# Patient Record
Sex: MALE | NOT HISPANIC OR LATINO | Employment: FULL TIME | URBAN - NONMETROPOLITAN AREA
[De-identification: names, ages, dates, MRNs, and addresses within clinical notes are randomized per-mention and may not be internally consistent; named-entity substitution may affect disease eponyms.]

---

## 2017-01-19 ENCOUNTER — HOSPITAL ENCOUNTER (OUTPATIENT)
Dept: LAB | Facility: MEDICAL CENTER | Age: 32
End: 2017-01-19
Attending: INTERNAL MEDICINE
Payer: COMMERCIAL

## 2017-01-19 LAB
ALBUMIN SERPL BCP-MCNC: 4.4 G/DL (ref 3.2–4.9)
ALBUMIN/GLOB SERPL: 1.3 G/DL
ALP SERPL-CCNC: 86 U/L (ref 30–99)
ALT SERPL-CCNC: 121 U/L (ref 2–50)
ANION GAP SERPL CALC-SCNC: 7 MMOL/L (ref 0–11.9)
AST SERPL-CCNC: 52 U/L (ref 12–45)
BILIRUB SERPL-MCNC: 0.5 MG/DL (ref 0.1–1.5)
BUN SERPL-MCNC: 13 MG/DL (ref 8–22)
CALCIUM SERPL-MCNC: 9.5 MG/DL (ref 8.5–10.5)
CHLORIDE SERPL-SCNC: 103 MMOL/L (ref 96–112)
CO2 SERPL-SCNC: 28 MMOL/L (ref 20–33)
CORTIS SERPL-MCNC: 16.6 UG/DL (ref 0–23)
CREAT SERPL-MCNC: 0.86 MG/DL (ref 0.5–1.4)
CREAT UR-MCNC: 157.2 MG/DL
EST. AVERAGE GLUCOSE BLD GHB EST-MCNC: 255 MG/DL
GLOBULIN SER CALC-MCNC: 3.4 G/DL (ref 1.9–3.5)
GLUCOSE SERPL-MCNC: 134 MG/DL (ref 65–99)
HBA1C MFR BLD: 10.5 % (ref 0–5.6)
POTASSIUM SERPL-SCNC: 4.1 MMOL/L (ref 3.6–5.5)
PROT SERPL-MCNC: 7.8 G/DL (ref 6–8.2)
SODIUM SERPL-SCNC: 138 MMOL/L (ref 135–145)
T3FREE SERPL-MCNC: 3.93 PG/ML (ref 2.4–4.2)
T4 FREE SERPL-MCNC: 1.12 NG/DL (ref 0.53–1.43)
TSH SERPL DL<=0.005 MIU/L-ACNC: 2.77 UIU/ML (ref 0.3–3.7)

## 2017-01-19 PROCEDURE — 84105 ASSAY OF URINE PHOSPHORUS: CPT

## 2017-01-19 PROCEDURE — 82340 ASSAY OF CALCIUM IN URINE: CPT

## 2017-01-19 PROCEDURE — 82088 ASSAY OF ALDOSTERONE: CPT

## 2017-01-19 PROCEDURE — 80053 COMPREHEN METABOLIC PANEL: CPT

## 2017-01-19 PROCEDURE — 36415 COLL VENOUS BLD VENIPUNCTURE: CPT

## 2017-01-19 PROCEDURE — 83036 HEMOGLOBIN GLYCOSYLATED A1C: CPT

## 2017-01-19 PROCEDURE — 84439 ASSAY OF FREE THYROXINE: CPT

## 2017-01-19 PROCEDURE — 84443 ASSAY THYROID STIM HORMONE: CPT

## 2017-01-19 PROCEDURE — 82570 ASSAY OF URINE CREATININE: CPT

## 2017-01-19 PROCEDURE — 82533 TOTAL CORTISOL: CPT

## 2017-01-19 PROCEDURE — 84481 FREE ASSAY (FT-3): CPT

## 2017-01-21 LAB
ALDOST SERPL-MCNC: 5.6 NG/DL
PHOSPHATE 24H UR-MCNC: 75 MG/DL
PHOSPHATE 24H UR-MRATE: NORMAL MG/D (ref 400–1300)
PHOSPHATE/CREAT 24H UR: 547 MG/G
TOTAL VOLUME 1105: NORMAL ML

## 2017-01-23 LAB
CALCIUM 24H UR-MCNC: 4.2 MG/DL
CALCIUM 24H UR-MRATE: NORMAL MG/D
CALCIUM/CREAT 24H UR: 31 MG/G (ref 20–240)
CREAT 24H UR-MCNC: 137 MG/DL
CREAT 24H UR-MRATE: NORMAL MG/D (ref 1000–2500)
TOTAL VOLUME 1105: NORMAL ML

## 2017-12-20 ENCOUNTER — OFFICE VISIT (OUTPATIENT)
Dept: URGENT CARE | Facility: PHYSICIAN GROUP | Age: 32
End: 2017-12-20
Payer: COMMERCIAL

## 2017-12-20 VITALS
TEMPERATURE: 97.6 F | WEIGHT: 300 LBS | HEART RATE: 96 BPM | RESPIRATION RATE: 14 BRPM | BODY MASS INDEX: 42 KG/M2 | OXYGEN SATURATION: 93 % | DIASTOLIC BLOOD PRESSURE: 96 MMHG | HEIGHT: 71 IN | SYSTOLIC BLOOD PRESSURE: 124 MMHG

## 2017-12-20 DIAGNOSIS — R42 DIZZINESS: ICD-10-CM

## 2017-12-20 LAB
GLUCOSE BLD-MCNC: 113 MG/DL (ref 70–100)
HBA1C MFR BLD: 8.1 % (ref ?–5.8)
INT CON NEG: NEGATIVE
INT CON POS: POSITIVE

## 2017-12-20 PROCEDURE — 99214 OFFICE O/P EST MOD 30 MIN: CPT | Performed by: PHYSICIAN ASSISTANT

## 2017-12-20 PROCEDURE — 82962 GLUCOSE BLOOD TEST: CPT | Performed by: PHYSICIAN ASSISTANT

## 2017-12-20 PROCEDURE — 83036 HEMOGLOBIN GLYCOSYLATED A1C: CPT | Performed by: PHYSICIAN ASSISTANT

## 2017-12-20 RX ORDER — MECLIZINE HYDROCHLORIDE 25 MG/1
25 TABLET ORAL 3 TIMES DAILY PRN
Qty: 30 TAB | Refills: 0 | Status: SHIPPED
Start: 2017-12-20 | End: 2020-01-23

## 2017-12-20 NOTE — LETTER
December 20, 2017         Patient: Trev Castillo   YOB: 1985   Date of Visit: 12/20/2017           To Whom it May Concern:    Trev Castillo was seen in my clinic on 12/20/2017. He may return to school on 12/22/17.    If you have any questions or concerns, please don't hesitate to call.        Sincerely,           Leanne Mckeon P.A.-C.  Electronically Signed

## 2017-12-20 NOTE — PROGRESS NOTES
"Chief Complaint   Patient presents with   • Nasal Congestion       HISTORY OF PRESENT ILLNESS: Patient is a 32 y.o. male who presents today for the following:    Dizzy and light headed x last night  Denies: Chest pain, diaphoresis, abdominal pain fever, nasal congestion, cough, ST, ear pain, SOB, urinary symptoms, changes in bowels, unexplained weight loss, nausea, vomiting, shortness of breath  + HA  Symptoms are worse with head movement and change in position  Reports recent sore throat and nasal congestion but it did not last very long  OTC meds tried: none  Currently feels off balance  H/o light headedness due to high blood sugar  H/o Lantus, metformin; around 10-11 months  Taking \"berberine\", OTC, to help maintain BG  Last checked blood glucose last night: 92     Patient Active Problem List    Diagnosis Date Noted   • Low back pain 08/29/2014   • Migraine headache without aura 08/06/2014       Allergies:Pcn [penicillins]    Current Outpatient Prescriptions Ordered in [facility]   Medication Sig Dispense Refill   • meclizine (ANTIVERT) 25 MG Tab Take 1 Tab by mouth 3 times a day as needed for Dizziness. 30 Tab 0     No current Epic-ordered facility-administered medications on file.        Past Medical History:   Diagnosis Date   • Migraine headache        Social History   Substance Use Topics   • Smoking status: Never Smoker   • Smokeless tobacco: Never Used   • Alcohol use No      Comment: 4-5 beers twice per week       No family status information on file.     Family History   Problem Relation Age of Onset   • Other Brother      migraine       ROS:    Review of Systems   Constitutional: Negative for fever, chills, weight loss and malaise/fatigue.   HENT: Negative for ear pain, nosebleeds, congestion, sore throat and neck pain.    Eyes: Negative for blurred vision.   Respiratory: Negative for cough, sputum production, shortness of breath and wheezing.    Cardiovascular: Negative for chest pain, palpitations, " "orthopnea and leg swelling.   Gastrointestinal: Negative for heartburn, nausea, vomiting and abdominal pain.   Genitourinary: Negative for dysuria, urgency and frequency.       Exam:  Blood pressure 124/96, pulse 96, temperature 36.4 °C (97.6 °F), resp. rate 14, height 1.803 m (5' 11\"), weight (!) 136.1 kg (300 lb), SpO2 93 %.  General: Well developed, well nourished. No distress.  HEENT: Conjunctiva clear, lids without ptosis, PERRL/EOMI. Ears normal shape and contour, canals are clear bilaterally, tympanic membranes are benign. Nasal mucosa benign. Oropharynx is without erythema, edema or exudates. Reasonable dentition.  Neck: Trachea midline, no masses. No thyromegaly. No carotid bruits noted.  Pulmonary: Clear to ausculation and percussion.  Normal effort. No rales, ronchi, or wheezing.   Cardiovascular: Regular rate and rhythm without murmur. No edema.   Abdomen: Soft, non-tender, nondistended. No hepatosplenomegaly.  Bowel sounds within normal limits.  Neurologic: Grossly nonfocal.  Lymph: No cervical lymphadenopathy noted.  Skin: Warm, dry, good turgor. No rashes in visible areas.   Psych: Normal mood. Alert and oriented x3. Judgment and insight is normal.     Glucose: 113  POCT A1c: 8.1    Assessment/Plan:  Discussed likely viral etiology. Discussed appropriate over-the-counter symptomatic medication, and when to return to clinic.  1. Uncontrolled type 2 diabetes mellitus without complication, without long-term current use of insulin (CMS-[facility])     Recommend following up with primary care provider for further evaluation and management. Discussed importance of lifestyle changes to promote weight loss but may need to go back on prescription medication for diabetes while he is making these changes. Discussed potential complications from uncontrolled diabetes.  POCT  A1C    POCT glucose   2. Dizziness   Discussed unknown etiology. No apparent signs of stroke. Use all medication as directed. Follow up for " worsening or persistent symptoms.  meclizine (ANTIVERT) 25 MG Tab

## 2018-01-15 ENCOUNTER — OFFICE VISIT (OUTPATIENT)
Dept: URGENT CARE | Facility: PHYSICIAN GROUP | Age: 33
End: 2018-01-15
Payer: COMMERCIAL

## 2018-01-15 VITALS
BODY MASS INDEX: 41.86 KG/M2 | TEMPERATURE: 97.2 F | HEART RATE: 80 BPM | RESPIRATION RATE: 16 BRPM | SYSTOLIC BLOOD PRESSURE: 130 MMHG | OXYGEN SATURATION: 96 % | DIASTOLIC BLOOD PRESSURE: 92 MMHG | HEIGHT: 71 IN | WEIGHT: 299 LBS

## 2018-01-15 DIAGNOSIS — E66.01 MORBID OBESITY WITH BMI OF 40.0-44.9, ADULT (HCC): ICD-10-CM

## 2018-01-15 DIAGNOSIS — J00 OTHER ACUTE RHINITIS: ICD-10-CM

## 2018-01-15 DIAGNOSIS — H92.01 OTALGIA OF RIGHT EAR: ICD-10-CM

## 2018-01-15 PROCEDURE — 99214 OFFICE O/P EST MOD 30 MIN: CPT | Performed by: PHYSICIAN ASSISTANT

## 2018-01-15 RX ORDER — FLUTICASONE PROPIONATE 50 MCG
1 SPRAY, SUSPENSION (ML) NASAL 2 TIMES DAILY
Qty: 1 BOTTLE | Refills: 0 | Status: SHIPPED
Start: 2018-01-15 | End: 2020-01-23

## 2018-01-15 NOTE — PROGRESS NOTES
"Chief Complaint   Patient presents with   • Otalgia     difficulty hearing, ringing, All Sx x3d       HISTORY OF PRESENT ILLNESS: Patient is a 32 y.o. male who presents today for the following:    Right ear pain x 3 days  Denies drainage, ST  + ringing, difficulty hearing, nasal, cough  OTC ear drops without relief    Patient Active Problem List    Diagnosis Date Noted   • Morbid obesity with BMI of 40.0-44.9, adult ([facility]) 01/15/2018   • Low back pain 08/29/2014   • Migraine headache without aura 08/06/2014       Allergies:Pcn [penicillins]    Current Outpatient Prescriptions Ordered in [facility]   Medication Sig Dispense Refill   • fluticasone (FLONASE) 50 MCG/ACT nasal spray Spray 1 Spray in nose 2 times a day. 1 Bottle 0   • meclizine (ANTIVERT) 25 MG Tab Take 1 Tab by mouth 3 times a day as needed for Dizziness. 30 Tab 0     No current Epic-ordered facility-administered medications on file.        Past Medical History:   Diagnosis Date   • Migraine headache        Social History   Substance Use Topics   • Smoking status: Never Smoker   • Smokeless tobacco: Never Used   • Alcohol use No      Comment: 4-5 beers twice per week       Family Status   Relation Status   • Brother      Family History   Problem Relation Age of Onset   • Other Brother      migraine       ROS:    Review of Systems   Constitutional: Negative for fever, chills, weight loss and malaise/fatigue.   HENT: Negative for nosebleeds, sore throat and neck pain.    Eyes: Negative for blurred vision.   Respiratory: Negative for sputum production, shortness of breath and wheezing.    Cardiovascular: Negative for chest pain, palpitations, orthopnea and leg swelling.   Gastrointestinal: Negative for heartburn, nausea, vomiting and abdominal pain.   Genitourinary: Negative for dysuria, urgency and frequency.       Exam:  Blood pressure 130/92, pulse 80, temperature 36.2 °C (97.2 °F), resp. rate 16, height 1.803 m (5' 11\"), weight (!) 135.6 kg (299 lb), SpO2 96 " %.  General: Well developed, well nourished. No distress.  HEENT: Conjunctiva clear, lids without ptosis, PERRL/EOMI. Ears normal shape and contour, canals are clear bilaterally, tympanic membranes are benign but bulging on the right side. Nasal mucosa benign. Oropharynx is without erythema, edema or exudates. Moist mucous membranes.  Pulmonary: Clear to ausculation and percussion.  Normal effort. No rales, ronchi, or wheezing.   Cardiovascular: Regular rate and rhythm without murmur. No edema.   Neurologic: Grossly nonfocal.  Lymph: No cervical lymphadenopathy noted.  Skin: Warm, dry, good turgor. No rashes in visible areas.   Psych: Normal mood. Alert and oriented x3. Judgment and insight is normal.    Assessment/Plan:  Discussed likely viral etiology. Discussed appropriate over-the-counter symptomatic medication, and when to return to clinic. Use medication as instructed. Follow-up for worsening or persistent symptoms.  1. Other acute rhinitis  fluticasone (FLONASE) 50 MCG/ACT nasal spray   2. Otalgia of right ear     3. Morbid obesity with BMI of 40.0-44.9, adult (CMS-[facility])  Patient identified as having weight management issue.  Appropriate orders and counseling given.

## 2018-02-08 ENCOUNTER — OFFICE VISIT (OUTPATIENT)
Dept: URGENT CARE | Facility: CLINIC | Age: 33
End: 2018-02-08
Payer: COMMERCIAL

## 2018-02-08 VITALS
OXYGEN SATURATION: 95 % | TEMPERATURE: 97.5 F | BODY MASS INDEX: 41.44 KG/M2 | WEIGHT: 296 LBS | DIASTOLIC BLOOD PRESSURE: 90 MMHG | HEART RATE: 104 BPM | RESPIRATION RATE: 16 BRPM | SYSTOLIC BLOOD PRESSURE: 128 MMHG | HEIGHT: 71 IN

## 2018-02-08 DIAGNOSIS — E11.00 UNCONTROLLED TYPE 2 DIABETES MELLITUS WITH HYPEROSMOLARITY WITHOUT COMA, UNSPECIFIED LONG TERM INSULIN USE STATUS: ICD-10-CM

## 2018-02-08 DIAGNOSIS — R00.0 TACHYCARDIA: ICD-10-CM

## 2018-02-08 DIAGNOSIS — R42 LIGHTHEADEDNESS: ICD-10-CM

## 2018-02-08 PROCEDURE — 99214 OFFICE O/P EST MOD 30 MIN: CPT | Performed by: NURSE PRACTITIONER

## 2018-02-08 ASSESSMENT — ENCOUNTER SYMPTOMS
MUSCULOSKELETAL NEGATIVE: 1
EYES NEGATIVE: 1
DIZZINESS: 1
CARDIOVASCULAR NEGATIVE: 1
NAUSEA: 1
RESPIRATORY NEGATIVE: 1
CHILLS: 1

## 2018-02-08 NOTE — PROGRESS NOTES
Subjective:      Trev Castillo is a 32 y.o. male who presents with Nausea    Past Medical History:   Diagnosis Date   • Migraine headache      Social History     Social History   • Marital status: Unknown     Spouse name: N/A   • Number of children: N/A   • Years of education: N/A     Occupational History   • Not on file.     Social History Main Topics   • Smoking status: Never Smoker   • Smokeless tobacco: Never Used   • Alcohol use No      Comment: 4-5 beers twice per week   • Drug use: No   • Sexual activity: Yes     Other Topics Concern   • Not on file     Social History Narrative   • No narrative on file     Family History   Problem Relation Age of Onset   • Other Brother      migraine       Allergies: Pcn [penicillins]    Patient is a 32-year-old male who presents today with complaint of feeling lightheaded and fatigued. Symptoms started over the last 7-10 days. States last night at home he became very lightheaded and almost went to the emergency room. Patient denies any chest pain or shortness of breath. Denies syncope. States he's had nausea but no vomiting. Patient does have a history of type 2 diabetes. He states he has not seen his physician in over a year. He occasionally takes metformin and insulin when he believes his blood sugar is elevated. He is not certain what his most recent A1c was. In reviewing the patient's chart, his last A1c was done on 12/20/17 and was 8.1%. Positive family history of diabetes in patient's mother and cardiovascular disease.            Nausea   This is a new problem. The current episode started in the past 7 days. The problem occurs intermittently. The problem has been waxing and waning. Associated symptoms include chills and nausea. Nothing aggravates the symptoms. He has tried nothing for the symptoms. The treatment provided no relief.       Review of Systems   Constitutional: Positive for chills and malaise/fatigue.   HENT: Negative.    Eyes: Negative.    Respiratory:  "Negative.    Cardiovascular: Negative.    Gastrointestinal: Positive for nausea.   Genitourinary: Negative.    Musculoskeletal: Negative.    Skin: Negative.    Neurological: Positive for dizziness.   All other systems reviewed and are negative.         Objective:     /90   Pulse (!) 104   Temp 36.4 °C (97.5 °F)   Resp 16   Ht 1.803 m (5' 11\")   Wt (!) 134.3 kg (296 lb)   SpO2 95%   BMI 41.28 kg/m²      Physical Exam   Constitutional: He is oriented to person, place, and time. He appears well-developed and well-nourished.   HENT:   Head: Normocephalic.   Right Ear: External ear normal.   Left Ear: External ear normal.   Nose: Nose normal.   Eyes: Conjunctivae and EOM are normal. Pupils are equal, round, and reactive to light.   Neck: Normal range of motion. Neck supple.   Cardiovascular: Regular rhythm and normal heart sounds.    HR elevated at 104   Pulmonary/Chest: Effort normal and breath sounds normal.   Abdominal: Soft. Bowel sounds are normal. He exhibits no distension and no mass. There is no tenderness. There is no guarding.   Musculoskeletal: Normal range of motion.   Neurological: He is alert and oriented to person, place, and time.   Skin: Skin is warm and dry. Capillary refill takes less than 2 seconds.   Psychiatric: He has a normal mood and affect. His behavior is normal. Judgment and thought content normal.   Vitals reviewed.    EKG: Sinus tachycardia, rate 105; no ST elevation or depression noted, no ischemic changes.        Discussed with patient that I am concerned for his ongoing symptoms; at this location and time of day I am limited on diagnostics.  Discussed going to ER with patient; he states he had considered this last night due to the severity of his symptoms.  States he is willing to go to ER for further evaluation. Patient declined EMS; states he will go via POV driven by a friend.     Assessment/Plan:   Light headedness  Tachycardia   -patient referred to ER for higher level " of care at this time   -patient discharged from UC in stable condition to be driven to ER via POV    There are no diagnoses linked to this encounter.

## 2018-06-08 ENCOUNTER — APPOINTMENT (OUTPATIENT)
Dept: SLEEP MEDICINE | Facility: MEDICAL CENTER | Age: 33
End: 2018-06-08
Payer: COMMERCIAL

## 2018-10-11 ENCOUNTER — OFFICE VISIT (OUTPATIENT)
Dept: INTERNAL MEDICINE | Facility: MEDICAL CENTER | Age: 33
End: 2018-10-11
Payer: COMMERCIAL

## 2018-10-11 VITALS
WEIGHT: 275.38 LBS | TEMPERATURE: 98.2 F | DIASTOLIC BLOOD PRESSURE: 98 MMHG | SYSTOLIC BLOOD PRESSURE: 128 MMHG | HEIGHT: 69 IN | HEART RATE: 94 BPM | BODY MASS INDEX: 40.79 KG/M2 | OXYGEN SATURATION: 98 %

## 2018-10-11 DIAGNOSIS — E66.01 CLASS 3 SEVERE OBESITY DUE TO EXCESS CALORIES WITH BODY MASS INDEX (BMI) OF 40.0 TO 44.9 IN ADULT, UNSPECIFIED WHETHER SERIOUS COMORBIDITY PRESENT (HCC): ICD-10-CM

## 2018-10-11 DIAGNOSIS — F12.90 MARIJUANA USE: ICD-10-CM

## 2018-10-11 DIAGNOSIS — R20.2 TINGLING IN EXTREMITIES: ICD-10-CM

## 2018-10-11 DIAGNOSIS — E11.65 UNCONTROLLED TYPE 2 DIABETES MELLITUS WITH HYPERGLYCEMIA (HCC): ICD-10-CM

## 2018-10-11 DIAGNOSIS — R79.89 LOW VITAMIN D LEVEL: ICD-10-CM

## 2018-10-11 DIAGNOSIS — Z00.00 HEALTHCARE MAINTENANCE: ICD-10-CM

## 2018-10-11 LAB
HBA1C MFR BLD: 6.2 % (ref ?–5.8)
INT CON NEG: NORMAL
INT CON POS: NORMAL

## 2018-10-11 PROCEDURE — 83036 HEMOGLOBIN GLYCOSYLATED A1C: CPT | Mod: GC | Performed by: INTERNAL MEDICINE

## 2018-10-11 PROCEDURE — 99204 OFFICE O/P NEW MOD 45 MIN: CPT | Mod: GC | Performed by: INTERNAL MEDICINE

## 2018-10-11 ASSESSMENT — ENCOUNTER SYMPTOMS
COUGH: 0
CONSTIPATION: 0
VOMITING: 0
DIZZINESS: 0
BLURRED VISION: 0
SORE THROAT: 0
HEARTBURN: 0
POLYDIPSIA: 0
DIARRHEA: 0
FEVER: 0
LOSS OF CONSCIOUSNESS: 0
CHILLS: 0
WEIGHT LOSS: 0
PALPITATIONS: 0
SHORTNESS OF BREATH: 0
HEADACHES: 1
MYALGIAS: 0
NAUSEA: 0
DOUBLE VISION: 0
SEIZURES: 0
SINUS PAIN: 0

## 2018-10-11 ASSESSMENT — LIFESTYLE VARIABLES: SUBSTANCE_ABUSE: 1

## 2018-10-11 ASSESSMENT — PATIENT HEALTH QUESTIONNAIRE - PHQ9: CLINICAL INTERPRETATION OF PHQ2 SCORE: 0

## 2018-10-11 NOTE — PROGRESS NOTES
New Patient to Establish    Reason to establish: Acute Illness    CC: Numbness and tingling    HPI:   Numbness and tingling in hands and feet:  For the past couple months the patient has had numbness/tingling in his hands and feet. He states that it comes and goes. It is not associated with weakness. He has had no incontinence bladder/bowel.  No changes in gait. He denies any pain. He did not notice any sores on his feet.     History of diabetes:  Not currently on medication. Diagnosed December of last year. He was given insulin and metformin ran out of prescription and never refilled. States he checks his blood sugar at home with a monitor and it has run between . His A1c in clinic today was 6.2.     Headaches: Patient has a history of migraines. Not currently on medication. He has taken Imitrex in the past, but it did not help. He is taking marijuana for the headaches and states that it helps. He has the headache once every two weeks. He states that it is located in the front of his head on both sides. He has sensitivity to light, and it feels like a pressure behind his eyes. He has associated nausea, but no vomiting. The sometimes last all day/ day and a half. He states that he sleeps the headache off and uses marijuana to help with it. He denies any aura with the headache. Last headache was 2 days ago. Denies headache when he wakes up in the morning. He states that he was worked up for these headaches and given scans of his brain. He states that the workup never revealed any abnormalities.     The patient declined flu shot today.     Patient Active Problem List    Diagnosis Date Noted   • Morbid obesity with BMI of 40.0-44.9, adult ([facility]) 01/15/2018   • Low back pain 08/29/2014   • Migraine headache without aura 08/06/2014       Past Medical History:   Diagnosis Date   • Migraine headache        Current Outpatient Prescriptions   Medication Sig Dispense Refill   • fluticasone (FLONASE) 50 MCG/ACT nasal  spray Spray 1 Spray in nose 2 times a day. 1 Bottle 0   • meclizine (ANTIVERT) 25 MG Tab Take 1 Tab by mouth 3 times a day as needed for Dizziness. 30 Tab 0     No current facility-administered medications for this visit.        Allergies as of 10/11/2018 - Reviewed 10/11/2018   Allergen Reaction Noted   • Pcn [penicillins] Itching 09/03/2013       Social History     Social History   • Marital status: Unknown     Spouse name: N/A   • Number of children: N/A   • Years of education: N/A     Occupational History   • Not on file.     Social History Main Topics   • Smoking status: Never Smoker   • Smokeless tobacco: Never Used   • Alcohol use No      Comment: 4-5 beers twice per week   • Drug use: No   • Sexual activity: Yes     Other Topics Concern   • Not on file     Social History Narrative   • No narrative on file       Family History   Problem Relation Age of Onset   • Other Brother         migraine       Past Surgical History:   Procedure Laterality Date   • APPENDECTOMY         ROS:  Review of Systems   Constitutional: Negative for chills, fever and weight loss.   HENT: Negative for congestion, sinus pain and sore throat.    Eyes: Negative for blurred vision and double vision.   Respiratory: Negative for cough and shortness of breath.    Cardiovascular: Negative for chest pain, palpitations and leg swelling.   Gastrointestinal: Negative for constipation, diarrhea, heartburn, nausea and vomiting.   Genitourinary: Negative for dysuria, frequency and urgency.   Musculoskeletal: Negative for joint pain and myalgias.   Skin: Negative for itching and rash.   Neurological: Positive for headaches. Negative for dizziness, seizures and loss of consciousness.   Endo/Heme/Allergies: Negative for environmental allergies and polydipsia.   Psychiatric/Behavioral: Positive for substance abuse. Negative for suicidal ideas.       /98 (BP Location: Left arm, Patient Position: Sitting, BP Cuff Size: Large adult)   Pulse 94    "Temp 36.8 °C (98.2 °F) (Temporal)   Ht 1.759 m (5' 9.25\")   Wt 124.9 kg (275 lb 6 oz)   SpO2 98%   BMI 40.37 kg/m²     Physical Exam  General:  Alert and oriented, No apparent distress.    Eyes: Pupils equal and reactive. No scleral icterus.    Throat: Clear no erythema or exudates noted.    Neck: Supple. No lymphadenopathy noted. Thyroid not enlarged.    Lungs: Clear to auscultation and percussion bilaterally.    Cardiovascular: Regular rate and rhythm. No murmurs, rubs or gallops.    Abdomen:  Benign. No rebound or guarding noted.    Extremities: No clubbing, cyanosis, edema.    Skin: Clear. No rash or suspicious skin lesions noted.    Monofilament testing with a 10 gram force: sensation: intact bilaterally  Visual Inspection: Feet without maceration, ulcers, or fissures.  Pedal pulses: intact bilaterally      Assessment and Plan    1. Numbness tingling hands and feet  2. History of Diabetes  - Follow up A1c, lipids, CBC, CMP, B12, TSH, and microalbumin  - POCT A1c is 6.2.   - Patient has already taken Diabetes education class  - Last eye exam was earlier this year  - Last foot exam was today in clinic  - Patient understands the importance of checking feet for ulcers  - Patient's diabetes is well-controlled with diet and exercise.  - Will continue to monitor    3. History of migraine headaches without Aura  - Patient has been using Marijuana to control symptoms of headache.   - Will continue to monitor    4. Morbid Obesity  - Patient is agreeable to exercise and weight loss. Is not currently exercising.    5. History of Vitamin D deficiency  - Patient has a history of low Vitamin D  - Follow up Vitamin D level.    Followup: Return in about 5 weeks (around 11/15/2018).    Risk Assessment (discuss potential complications a function of chronic problems): Encouraged healthy diet, exercise, and continuing to monitor blood sugar. Encouraged weight loss to help control.     Complexity (discuss number of " co-morbidities): 2    Signed by: Tong Jones M.D.

## 2018-10-22 ENCOUNTER — HOSPITAL ENCOUNTER (OUTPATIENT)
Dept: LAB | Facility: MEDICAL CENTER | Age: 33
End: 2018-10-22
Attending: PHYSICIAN ASSISTANT
Payer: COMMERCIAL

## 2018-10-22 ENCOUNTER — OFFICE VISIT (OUTPATIENT)
Dept: URGENT CARE | Facility: PHYSICIAN GROUP | Age: 33
End: 2018-10-22
Payer: COMMERCIAL

## 2018-10-22 ENCOUNTER — APPOINTMENT (OUTPATIENT)
Dept: RADIOLOGY | Facility: IMAGING CENTER | Age: 33
End: 2018-10-22
Attending: PHYSICIAN ASSISTANT
Payer: COMMERCIAL

## 2018-10-22 VITALS
OXYGEN SATURATION: 98 % | BODY MASS INDEX: 40.52 KG/M2 | RESPIRATION RATE: 18 BRPM | SYSTOLIC BLOOD PRESSURE: 130 MMHG | TEMPERATURE: 98.3 F | HEART RATE: 97 BPM | DIASTOLIC BLOOD PRESSURE: 82 MMHG | HEIGHT: 70 IN | WEIGHT: 283 LBS

## 2018-10-22 DIAGNOSIS — R06.02 SHORTNESS OF BREATH: Primary | ICD-10-CM

## 2018-10-22 DIAGNOSIS — R06.02 SHORTNESS OF BREATH: ICD-10-CM

## 2018-10-22 LAB
BNP SERPL-MCNC: 5 PG/ML (ref 0–100)
D DIMER PPP IA.FEU-MCNC: <0.4 UG/ML (FEU) (ref 0–0.5)

## 2018-10-22 PROCEDURE — 99214 OFFICE O/P EST MOD 30 MIN: CPT | Performed by: PHYSICIAN ASSISTANT

## 2018-10-22 PROCEDURE — 93000 ELECTROCARDIOGRAM COMPLETE: CPT | Performed by: PHYSICIAN ASSISTANT

## 2018-10-22 PROCEDURE — 83880 ASSAY OF NATRIURETIC PEPTIDE: CPT

## 2018-10-22 PROCEDURE — 85379 FIBRIN DEGRADATION QUANT: CPT

## 2018-10-22 PROCEDURE — 71046 X-RAY EXAM CHEST 2 VIEWS: CPT | Mod: 26 | Performed by: PHYSICIAN ASSISTANT

## 2018-10-22 PROCEDURE — 36415 COLL VENOUS BLD VENIPUNCTURE: CPT

## 2018-10-22 NOTE — PROGRESS NOTES
Subjective:      Trev Castillo is a 33 y.o. male who presents with Shortness of Breath (feels like he can't breathe when he has to walk)    PMH:  has a past medical history of Migraine headache.  MEDS:   Current Outpatient Prescriptions:   •  fluticasone (FLONASE) 50 MCG/ACT nasal spray, Spray 1 Spray in nose 2 times a day., Disp: 1 Bottle, Rfl: 0  •  meclizine (ANTIVERT) 25 MG Tab, Take 1 Tab by mouth 3 times a day as needed for Dizziness., Disp: 30 Tab, Rfl: 0  ALLERGIES:   Allergies   Allergen Reactions   • Pcn [Penicillins] Itching     SURGHX:   Past Surgical History:   Procedure Laterality Date   • APPENDECTOMY       SOCHX:  reports that he has never smoked. He has never used smokeless tobacco. He reports that he drinks alcohol. He reports that he uses drugs, including Marijuana.  FH: Reviewed with patient/family. Not pertinent to this complaint.            Patient presents with:  Shortness of Breath: feels like he can't breathe when he has to walk extended times and when going up stairs.  PT states this began this morning.  PT denies chest pain, chest pressure, neck pain, back pain, upper extremity pain, lower extremity swelling or recent travel.      PT states he went out for his birthday a two nights ago and states he was very very hung over, with episodes of vomiting and not eating/drinking yesterday , states he thinks this may be part of his symptoms.  Pt states he attempted to work today, but had to leave and needs a note.             Shortness of Breath   This is a new problem. The current episode started yesterday. The problem occurs intermittently. The problem has been waxing and waning. Associated symptoms include headaches and vomiting (yesterday). Pertinent negatives include no abdominal pain, chest pain, fever, hemoptysis, leg pain, leg swelling, orthopnea, PND, sputum production or wheezing. The symptoms are aggravated by exercise. The patient has no known risk factors for DVT/PE. He has tried nothing  "for the symptoms. The treatment provided no relief. There is no history of asthma, bronchiolitis, CAD, COPD, DVT, a heart failure, PE or a recent surgery.       Review of Systems   Constitutional: Positive for malaise/fatigue. Negative for fever.   Respiratory: Positive for shortness of breath. Negative for cough, hemoptysis, sputum production and wheezing.    Cardiovascular: Negative for chest pain, orthopnea, leg swelling and PND.   Gastrointestinal: Positive for vomiting (yesterday). Negative for abdominal pain.   Neurological: Positive for headaches.   All other systems reviewed and are negative.         Objective:     /82   Pulse 97   Temp 36.8 °C (98.3 °F) (Temporal)   Resp 18   Ht 1.778 m (5' 10\")   Wt (!) 128.4 kg (283 lb)   SpO2 98%   BMI 40.61 kg/m²      Physical Exam   Constitutional: He is oriented to person, place, and time. Vital signs are normal. He appears well-developed and well-nourished. He does not appear ill. No distress.   HENT:   Head: Normocephalic and atraumatic.   Nose: Nose normal.   Eyes: Pupils are equal, round, and reactive to light. Conjunctivae and EOM are normal.   Neck: Normal range of motion. Neck supple.   Cardiovascular: Normal rate, regular rhythm and normal heart sounds.    Pulmonary/Chest: Effort normal and breath sounds normal. No respiratory distress. He has no wheezes. He has no rales. He exhibits no tenderness.   Abdominal: Soft.   Musculoskeletal: Normal range of motion. He exhibits no edema.   Neurological: He is alert and oriented to person, place, and time. Gait normal.   Skin: Skin is warm and dry. Capillary refill takes less than 2 seconds.   Psychiatric: He has a normal mood and affect.   Nursing note and vitals reviewed.         EKG Interpretation   Interpreted by me   Rhythm: normal sinus   Rate: normal   Axis: normal   Ectopy: none   Conduction: normal   ST Segments: no acute change   T Waves: no acute change   Q Waves: none   Clinical Impression: no " acute changes and normal EKG    Xray images viewed and interpreted by me, confirmed by radiology:    No acute infiltrate, no PTX or free air. No acute findings.       Assessment/Plan:     1. Shortness of breath  DX-CHEST-2 VIEWS    BTYPE NATRIURETIC PEPTIDE    D-DIMER     Discussed DDx of PE, CHF, PNA, flu.  PT agreed to EKG, CXR now.  Both were normal.  PT will have blood drawn (D-Dimer and BNP) in the lab. Declined flu test.     Pt states he does not feel that it is necessary to be seen in ED at this time.     PT should follow up with PCP in 1-2 days for re-evaluation if symptoms have not improved.  Discussed red flags and reasons to return to UC or ED.  Pt and/or family verbalized understanding of diagnosis and follow up instructions and was offered informational handout on diagnosis.  PT discharged.

## 2018-10-22 NOTE — LETTER
October 22, 2018         Patient: Trev Castillo   YOB: 1985   Date of Visit: 10/22/2018           To Whom it May Concern:    Trev Castillo was seen in my clinic on 10/22/2018. He may return to work on 10/23/2018.    If you have any questions or concerns, please don't hesitate to call.        Sincerely,           Poppy Meehan P.A.-C.  Electronically Signed

## 2018-10-23 ASSESSMENT — ENCOUNTER SYMPTOMS: SHORTNESS OF BREATH: 1

## 2018-10-25 ASSESSMENT — ENCOUNTER SYMPTOMS
ABDOMINAL PAIN: 0
VOMITING: 1
WHEEZING: 0
ORTHOPNEA: 0
HEMOPTYSIS: 0
PND: 0
FEVER: 0
SPUTUM PRODUCTION: 0
COUGH: 0
HEADACHES: 1
LEG PAIN: 0

## 2018-10-25 ASSESSMENT — COPD QUESTIONNAIRES: COPD: 0

## 2019-04-02 ENCOUNTER — PATIENT MESSAGE (OUTPATIENT)
Dept: HEALTH INFORMATION MANAGEMENT | Facility: OTHER | Age: 34
End: 2019-04-02

## 2020-01-14 ENCOUNTER — OFFICE VISIT (OUTPATIENT)
Dept: URGENT CARE | Facility: PHYSICIAN GROUP | Age: 35
End: 2020-01-14
Payer: COMMERCIAL

## 2020-01-14 VITALS
TEMPERATURE: 99.2 F | RESPIRATION RATE: 18 BRPM | DIASTOLIC BLOOD PRESSURE: 80 MMHG | HEART RATE: 103 BPM | SYSTOLIC BLOOD PRESSURE: 130 MMHG | BODY MASS INDEX: 37.02 KG/M2 | OXYGEN SATURATION: 96 % | WEIGHT: 258 LBS

## 2020-01-14 DIAGNOSIS — Z86.39 HISTORY OF DIABETES MELLITUS, TYPE II: ICD-10-CM

## 2020-01-14 DIAGNOSIS — J22 LRTI (LOWER RESPIRATORY TRACT INFECTION): ICD-10-CM

## 2020-01-14 PROCEDURE — 99214 OFFICE O/P EST MOD 30 MIN: CPT | Performed by: PHYSICIAN ASSISTANT

## 2020-01-14 RX ORDER — AZITHROMYCIN 250 MG/1
TABLET, FILM COATED ORAL
Qty: 6 TAB | Refills: 0 | Status: SHIPPED
Start: 2020-01-14 | End: 2020-01-23

## 2020-01-14 RX ORDER — ALBUTEROL SULFATE 90 UG/1
1-2 AEROSOL, METERED RESPIRATORY (INHALATION) EVERY 6 HOURS PRN
Qty: 1 INHALER | Refills: 0 | Status: SHIPPED
Start: 2020-01-14 | End: 2020-02-28

## 2020-01-14 RX ORDER — BENZONATATE 100 MG/1
100 CAPSULE ORAL 3 TIMES DAILY PRN
Qty: 30 CAP | Refills: 0 | Status: SHIPPED
Start: 2020-01-14 | End: 2020-01-23

## 2020-01-14 ASSESSMENT — ENCOUNTER SYMPTOMS
MYALGIAS: 1
CHILLS: 1
SHORTNESS OF BREATH: 1
DIARRHEA: 0
NECK PAIN: 0
PALPITATIONS: 0
DIZZINESS: 0
HEADACHES: 0
ABDOMINAL PAIN: 0
NAUSEA: 0
VOMITING: 0
EYE REDNESS: 0
SPUTUM PRODUCTION: 1
WHEEZING: 0
CONSTIPATION: 0
POLYDIPSIA: 0
EYE PAIN: 0
SORE THROAT: 1
COUGH: 1
FEVER: 1

## 2020-01-15 NOTE — PROGRESS NOTES
Subjective:      Trev Castillo is a 34 y.o. male who presents with Headache (cough, sore throat, congestion, tired x4 days )            HPI   34-year-old male presents urgent care with new problem of headache, productive cough, sore throat and nasal congestion onset 4 days ago.  Patient reports mild associated wheezing, he denies shortness of breath.  Reports subjective fevers, chills, and generalized fatigue. +body aches.   Patient reports his wife has similar symptoms.  Patient did not get influenza vaccination this season.   Denies history or asthma or tobacco use.   He has been taking Nyquil and teraflu with some relief.   Denies other associated aggravating or alleviating factors.     2. Medication refill   Patient requesting medication refill for metformin.  Patient states he lost his primary care physician.  He states he has been out of his medication for several months.  He denies abdominal pain, nausea, vomiting, or fevers.    Review of Systems   Constitutional: Positive for chills, fever and malaise/fatigue.   HENT: Positive for congestion, ear pain and sore throat.    Eyes: Negative for pain and redness.   Respiratory: Positive for cough, sputum production and shortness of breath. Negative for wheezing.    Cardiovascular: Negative for chest pain and palpitations.   Gastrointestinal: Negative for abdominal pain, constipation, diarrhea, nausea and vomiting.   Genitourinary: Negative for dysuria, frequency and urgency.   Musculoskeletal: Positive for myalgias. Negative for neck pain.   Skin: Negative for rash.   Neurological: Negative for dizziness and headaches.   Endo/Heme/Allergies: Negative for environmental allergies and polydipsia.       Past Medical History:   Diagnosis Date   • Migraine headache      Current Outpatient Medications on File Prior to Visit   Medication Sig Dispense Refill   • fluticasone (FLONASE) 50 MCG/ACT nasal spray Spray 1 Spray in nose 2 times a day. (Patient not taking: Reported on  1/14/2020) 1 Bottle 0   • meclizine (ANTIVERT) 25 MG Tab Take 1 Tab by mouth 3 times a day as needed for Dizziness. (Patient not taking: Reported on 1/14/2020) 30 Tab 0     No current facility-administered medications on file prior to visit.      Allergies   Allergen Reactions   • Pcn [Penicillins] Itching     Social History     Tobacco Use   • Smoking status: Never Smoker   • Smokeless tobacco: Never Used   Substance Use Topics   • Alcohol use: Yes     Comment: 4-5 beers twice per week      Objective:     /80   Pulse (!) 103   Temp 37.3 °C (99.2 °F)   Resp 18   Wt 117 kg (258 lb)   SpO2 96%   BMI 37.02 kg/m²      Physical Exam  Vitals signs reviewed.   Constitutional:       General: He is not in acute distress.     Appearance: Normal appearance. He is well-developed. He is not ill-appearing.   HENT:      Head: Normocephalic and atraumatic.      Right Ear: Tympanic membrane normal.      Left Ear: Tympanic membrane normal.      Nose: Mucosal edema, congestion and rhinorrhea present.      Mouth/Throat:      Mouth: Mucous membranes are moist.      Pharynx: Posterior oropharyngeal erythema present. No oropharyngeal exudate.   Eyes:      Extraocular Movements: Extraocular movements intact.      Conjunctiva/sclera: Conjunctivae normal.   Neck:      Musculoskeletal: Normal range of motion and neck supple.   Cardiovascular:      Rate and Rhythm: Normal rate and regular rhythm.      Pulses: Normal pulses.      Heart sounds: Normal heart sounds.   Pulmonary:      Effort: Pulmonary effort is normal. No respiratory distress.      Breath sounds: Wheezing present. No rhonchi or rales.   Abdominal:      General: Bowel sounds are normal. There is no distension.      Palpations: Abdomen is soft.   Musculoskeletal: Normal range of motion.   Skin:     General: Skin is warm and dry.      Findings: No rash.   Neurological:      General: No focal deficit present.      Mental Status: He is alert and oriented to person, place,  and time.   Psychiatric:         Mood and Affect: Mood normal.         Behavior: Behavior normal.         Thought Content: Thought content normal.         Judgment: Judgment normal.                 Assessment/Plan:     1. History of diabetes mellitus, type II  HEMOGLOBIN A1C    Comp Metabolic Panel    ESTIMATED GFR   2. LRTI (lower respiratory tract infection)  azithromycin (ZITHROMAX) 250 MG Tab    albuterol 108 (90 Base) MCG/ACT Aero Soln inhalation aerosol    benzonatate (TESSALON) 100 MG Cap     1.  We will follow-up pending lab results.  I will consider medication refill for metformin after patient has lab work done.  Patient informed he must find primary care provider and cannot return to urgent care for refill of this medication in the future.    2.  Continue with over-the-counter cold and cough medications and Tylenol/Motrin for symptomatic relief.  PT should follow up with PCP in 1-2 days for re-evaluation if symptoms have not improved.  Discussed red flags and reasons to return to UC or ED.  Pt and/or family verbalized understanding of diagnosis and follow up instructions and was offered informational handout on diagnosis.  PT discharged.

## 2020-01-22 ENCOUNTER — TELEPHONE (OUTPATIENT)
Dept: SCHEDULING | Facility: IMAGING CENTER | Age: 35
End: 2020-01-22

## 2020-01-23 ENCOUNTER — OFFICE VISIT (OUTPATIENT)
Dept: MEDICAL GROUP | Facility: LAB | Age: 35
End: 2020-01-23
Payer: COMMERCIAL

## 2020-01-23 VITALS
BODY MASS INDEX: 36.51 KG/M2 | HEIGHT: 70 IN | TEMPERATURE: 97.1 F | RESPIRATION RATE: 18 BRPM | DIASTOLIC BLOOD PRESSURE: 92 MMHG | SYSTOLIC BLOOD PRESSURE: 148 MMHG | WEIGHT: 255 LBS | OXYGEN SATURATION: 100 % | HEART RATE: 92 BPM

## 2020-01-23 DIAGNOSIS — E11.65 UNCONTROLLED TYPE 2 DIABETES MELLITUS WITH HYPERGLYCEMIA (HCC): ICD-10-CM

## 2020-01-23 DIAGNOSIS — E78.2 MIXED HYPERLIPIDEMIA: ICD-10-CM

## 2020-01-23 DIAGNOSIS — R03.0 ELEVATED BLOOD PRESSURE READING: ICD-10-CM

## 2020-01-23 PROBLEM — Z00.00 HEALTHCARE MAINTENANCE: Status: RESOLVED | Noted: 2018-10-11 | Resolved: 2020-01-23

## 2020-01-23 LAB
HBA1C MFR BLD: 12.9 % (ref 0–5.6)
INT CON NEG: NEGATIVE
INT CON POS: POSITIVE

## 2020-01-23 PROCEDURE — 83036 HEMOGLOBIN GLYCOSYLATED A1C: CPT | Performed by: FAMILY MEDICINE

## 2020-01-23 PROCEDURE — 99204 OFFICE O/P NEW MOD 45 MIN: CPT | Performed by: FAMILY MEDICINE

## 2020-01-23 RX ORDER — GLUCOSAMINE HCL/CHONDROITIN SU 500-400 MG
CAPSULE ORAL
Qty: 100 EACH | Refills: 0 | Status: SHIPPED | OUTPATIENT
Start: 2020-01-23 | End: 2021-03-02

## 2020-01-23 RX ORDER — LANCETS 30 GAUGE
EACH MISCELLANEOUS
Qty: 100 EACH | Refills: 0 | Status: SHIPPED | OUTPATIENT
Start: 2020-01-23 | End: 2021-03-02

## 2020-01-23 ASSESSMENT — ENCOUNTER SYMPTOMS
SHORTNESS OF BREATH: 0
ABDOMINAL PAIN: 0
FEVER: 0
WEIGHT LOSS: 0
NAUSEA: 0
DIARRHEA: 0
PALPITATIONS: 0
COUGH: 0
VOMITING: 0
TINGLING: 0
BLURRED VISION: 0
CHILLS: 0

## 2020-01-23 ASSESSMENT — PATIENT HEALTH QUESTIONNAIRE - PHQ9: CLINICAL INTERPRETATION OF PHQ2 SCORE: 0

## 2020-01-23 NOTE — PROGRESS NOTES
Trev Castillo is a 34 y.o. male here for   Chief Complaint   Patient presents with   • Establish Care   • Diabetes Mellitus     2016-17 type 2 diabetes,        HPI:  Trev is a very pleasant 34 y.o. male.     #DM2   -Diagnosed approx. 2 years ago  -Previous medication: Metformin, Insulin (Lantus?)   -Pt states that for a time he had gotten his weight, A1c down to the point where no medication was needed; however, over the last year he states that he has put weight back on and has noted his blood sugar rising.   -Increased activity 2 month ago, running approx 1 mile a day, change in diet, reducing sugar intake.   -Here to reestablish for care of diabetes   -Endorses polydypsia, polyuria.  -Denies increased fatigue, neuopathy     #Hyperlipidemia  -Previous history of DLD according to labs; however, patient is unaware of previous diagnosis.   -Denies every taking medication for tx.   Results for TREV CASTILLO (MRN [id number]) as of 1/23/2020 16:16   Ref. Range 12/27/2016 06:27   Cholesterol,Tot Latest Ref Range: 100 - 199 mg/dL 271 (H)   Triglycerides Latest Ref Range: 0 - 149 mg/dL 128   HDL Latest Ref Range: >=40 mg/dL 37 (A)   LDL Latest Ref Range: <100 mg/dL 208 (H)     #Elevated BP reading today.   -no previous diagnosis of HTN.   -Denies headache, lightheadeness, dizziness, chest pain, SOB.     Current medicines (including changes today)  Current Outpatient Medications   Medication Sig Dispense Refill   • Alcohol Swabs Wipe site with prep pad prior to injection. 100 Each 0   • Lancets Use one One Touch Verio lancet to test blood sugar four times daily. 100 Each 0   • insulin glargine (INSULIN GLARGINE) 100 UNIT/ML Solution Pen-injector injection Inject 10 Units as instructed every evening for 360 days. 3 mL 11   • metformin (GLUCOPHAGE) 1000 MG tablet Take 1 Tab by mouth 2 times a day, with meals for 360 days. 180 Tab 3   • Insulin Pen Needle 32 G x 4 mm Use one pen needle in pen device to inject insulin four times daily.  100 Each 0   • Blood Glucose Test Strips Use one One Touch Verio strip to test blood sugar four times daily. 100 Strip 11   • albuterol 108 (90 Base) MCG/ACT Aero Soln inhalation aerosol Inhale 1-2 Puffs by mouth every 6 hours as needed for Shortness of Breath. 1 Inhaler 0     No current facility-administered medications for this visit.      He  has a past medical history of Diabetes (HCC), Hypertension, and Migraine headache.  He  has a past surgical history that includes appendectomy.  Social History     Tobacco Use   • Smoking status: Never Smoker   • Smokeless tobacco: Never Used   Substance Use Topics   • Alcohol use: Yes     Comment: Every now and then   • Drug use: Not Currently     Types: Marijuana     Comment: 1 x every 3 weeks     Social History     Patient does not qualify to have social determinant information on file (likely too young).   Social History Narrative   • Not on file     Family History   Problem Relation Age of Onset   • Other Brother         migraine   • Diabetes Mother    • Diabetes Brother      Family Status   Relation Name Status   • Bro  (Not Specified)   • Mo Eudelia Anna (Not Specified)   • Bro Jim Anna (Not Specified)         ROS  Review of Systems   Constitutional: Negative for chills, fever and weight loss.   HENT: Negative for hearing loss.    Eyes: Negative for blurred vision.   Respiratory: Negative for cough and shortness of breath.    Cardiovascular: Negative for chest pain and palpitations.   Gastrointestinal: Negative for abdominal pain, diarrhea, nausea and vomiting.   Genitourinary: Negative for dysuria.   Skin: Negative for rash.   Neurological: Negative for tingling.   All other systems reviewed and are negative.       Objective:     /92 (BP Location: Right arm, Patient Position: Sitting, BP Cuff Size: Adult)   Pulse 92   Temp 36.2 °C (97.1 °F) (Temporal)   Resp 18   Wt 115.7 kg (255 lb)   SpO2 100%  Body mass index is 36.59 kg/m².  Physical  Exam:    Constitutional: Alert, no distress.  Skin: Warm, dry, good turgor, no rashes in visible areas.  Eye: Equal, round and reactive, conjunctiva clear, lids normal.  ENMT: Lips without lesions, good dentition, oropharynx clear. TM's pearly gray with normal light reflexes bilaterally  Neck: Trachea midline, no masses, no thyromegaly. No cervical or supraclavicular lymphadenopathy.  Respiratory: Unlabored respiratory effort, lungs clear to auscultation bilaterally, no wheezes, rales, or ronchi.  Cardiovascular: Normal S1, S2, RRR, no murmur, no edema.  Abdomen: Soft, non-tender, no masses, no hepatosplenomegaly.  Psych: Alert and oriented x3, normal affect and mood.        Assessment and Plan:   The following treatment plan was discussed    1. Uncontrolled type 2 diabetes mellitus with hyperglycemia (HCC)  -chronic condition, new to me.   -status: unstable  -A1c completed in office, elevated to >12%. At this time with such elevated a1c and symptomatic, we will need to begin treatment with metformin as well as insulin.   -discussed the need to checking blood glucose QID.   -target fasting blood glucose 140. Will begin with 10 units lantus. Will increase 2 units every 4 days until target fasting blood glucose is met.   -will also meet with diabetes  as well as dietitian.   -F/u 1 month.   - POCT Hemoglobin A1C  - Lipid Profile; Future  - Basic Metabolic Panel; Future  - MICROALBUMIN CREAT RATIO URINE; Future  - REFERRAL TO NUTRITION SERVICES  - Alcohol Swabs; Wipe site with prep pad prior to injection.  Dispense: 100 Each; Refill: 0  - Lancets; Use one One Touch Verio lancet to test blood sugar four times daily.  Dispense: 100 Each; Refill: 0  - insulin glargine (INSULIN GLARGINE) 100 UNIT/ML Solution Pen-injector injection; Inject 10 Units as instructed every evening for 360 days.  Dispense: 3 mL; Refill: 11  - metformin (GLUCOPHAGE) 1000 MG tablet; Take 1 Tab by mouth 2 times a day, with meals for 360 days.   Dispense: 180 Tab; Refill: 3  - Insulin Pen Needle 32 G x 4 mm; Use one pen needle in pen device to inject insulin four times daily.  Dispense: 100 Each; Refill: 0  - Blood Glucose Test Strips; Use one One Touch Verio strip to test blood sugar four times daily.  Dispense: 100 Strip; Refill: 11    2. Elevated blood pressure reading  -No previous hx, asymptomatic at this point   -Patient told to check blood pressure occasionally over the next month. Report if higher than 140/80.   -If needed, will start on lisinopril for bp control given hx of DM2    3. Mixed hyperlipidemia  -Chronic condition, new to me.   -Status: unstable.   -Given hx of elevated cholesterol, LDL patient will need to be on statin.   -Will discuss at next visit.     Records requested.  Followup: Return in about 4 weeks (around 2/20/2020).         This note was created using voice recognition software. I have made every reasonable attempt to correct errors, however, I do anticipate some grammatical errors.

## 2020-02-20 ENCOUNTER — HOSPITAL ENCOUNTER (OUTPATIENT)
Dept: LAB | Facility: MEDICAL CENTER | Age: 35
End: 2020-02-20
Attending: FAMILY MEDICINE
Payer: COMMERCIAL

## 2020-02-20 DIAGNOSIS — E11.65 UNCONTROLLED TYPE 2 DIABETES MELLITUS WITH HYPERGLYCEMIA (HCC): ICD-10-CM

## 2020-02-20 LAB
ANION GAP SERPL CALC-SCNC: 8 MMOL/L (ref 0–11.9)
BUN SERPL-MCNC: 19 MG/DL (ref 8–22)
CALCIUM SERPL-MCNC: 9.3 MG/DL (ref 8.5–10.5)
CHLORIDE SERPL-SCNC: 105 MMOL/L (ref 96–112)
CHOLEST SERPL-MCNC: 180 MG/DL (ref 100–199)
CO2 SERPL-SCNC: 25 MMOL/L (ref 20–33)
CREAT SERPL-MCNC: 0.8 MG/DL (ref 0.5–1.4)
CREAT UR-MCNC: 129.8 MG/DL
FASTING STATUS PATIENT QL REPORTED: NORMAL
GLUCOSE SERPL-MCNC: 149 MG/DL (ref 65–99)
HDLC SERPL-MCNC: 41 MG/DL
LDLC SERPL CALC-MCNC: 124 MG/DL
MICROALBUMIN UR-MCNC: 1.4 MG/DL
MICROALBUMIN/CREAT UR: 11 MG/G (ref 0–30)
POTASSIUM SERPL-SCNC: 4.6 MMOL/L (ref 3.6–5.5)
SODIUM SERPL-SCNC: 138 MMOL/L (ref 135–145)
TRIGL SERPL-MCNC: 75 MG/DL (ref 0–149)

## 2020-02-20 PROCEDURE — 82570 ASSAY OF URINE CREATININE: CPT

## 2020-02-20 PROCEDURE — 36415 COLL VENOUS BLD VENIPUNCTURE: CPT

## 2020-02-20 PROCEDURE — 82043 UR ALBUMIN QUANTITATIVE: CPT

## 2020-02-20 PROCEDURE — 80061 LIPID PANEL: CPT

## 2020-02-20 PROCEDURE — 80048 BASIC METABOLIC PNL TOTAL CA: CPT

## 2020-02-28 ENCOUNTER — OFFICE VISIT (OUTPATIENT)
Dept: MEDICAL GROUP | Facility: LAB | Age: 35
End: 2020-02-28
Payer: COMMERCIAL

## 2020-02-28 VITALS
DIASTOLIC BLOOD PRESSURE: 80 MMHG | WEIGHT: 257 LBS | TEMPERATURE: 97.7 F | SYSTOLIC BLOOD PRESSURE: 132 MMHG | BODY MASS INDEX: 36.79 KG/M2 | OXYGEN SATURATION: 99 % | HEIGHT: 70 IN | HEART RATE: 96 BPM | RESPIRATION RATE: 12 BRPM

## 2020-02-28 DIAGNOSIS — E11.65 UNCONTROLLED TYPE 2 DIABETES MELLITUS WITH HYPERGLYCEMIA (HCC): ICD-10-CM

## 2020-02-28 DIAGNOSIS — E11.41 DIABETIC MONONEUROPATHY ASSOCIATED WITH TYPE 2 DIABETES MELLITUS (HCC): ICD-10-CM

## 2020-02-28 PROCEDURE — 99214 OFFICE O/P EST MOD 30 MIN: CPT | Performed by: FAMILY MEDICINE

## 2020-02-28 RX ORDER — CHOLECALCIFEROL (VITAMIN D3) 125 MCG
500 CAPSULE ORAL DAILY
COMMUNITY
End: 2021-03-02

## 2020-02-28 RX ORDER — VITAMIN B COMPLEX
2000 TABLET ORAL DAILY
COMMUNITY
End: 2021-03-02

## 2020-02-28 RX ORDER — GABAPENTIN 100 MG/1
100 CAPSULE ORAL 3 TIMES DAILY
Qty: 90 CAP | Refills: 3 | Status: SHIPPED | OUTPATIENT
Start: 2020-02-28 | End: 2020-03-29

## 2020-02-28 RX ORDER — AMPICILLIN TRIHYDRATE 250 MG
500 CAPSULE ORAL DAILY
COMMUNITY
End: 2021-03-02

## 2020-02-28 RX ORDER — BLOOD SUGAR DIAGNOSTIC
STRIP MISCELLANEOUS
COMMUNITY
Start: 2020-01-23 | End: 2021-03-02

## 2020-02-28 RX ORDER — ATORVASTATIN CALCIUM 40 MG/1
40 TABLET, FILM COATED ORAL DAILY
Qty: 90 TAB | Refills: 3 | Status: SHIPPED | OUTPATIENT
Start: 2020-02-28 | End: 2020-05-28

## 2020-02-28 ASSESSMENT — ENCOUNTER SYMPTOMS
VOMITING: 0
TINGLING: 1
SHORTNESS OF BREATH: 0
FEVER: 0
NAUSEA: 0
ABDOMINAL PAIN: 0
CHILLS: 0
WEIGHT LOSS: 0
BLURRED VISION: 0
WHEEZING: 0
PALPITATIONS: 0
DIARRHEA: 0

## 2020-02-28 NOTE — PROGRESS NOTES
Subjective:   Trev Castillo is a 34 y.o. male here today for   Chief Complaint   Patient presents with   • Follow-Up   • Diabetes Mellitus     Tingling, pens and needles, started monday or tuesday. on and off.        #Type 2 diabetes:  -Patient was last seen a month ago started on insulin, metformin for treatment of his uncontrolled diabetes.  He states his been working really hard on monitoring blood glucose, taking insulin daily.  He has been checking his blood glucose regularly.  Fasting blood glucose has been ranging around 120-140.  Working on low carbohydrate, high-protein diet.  -Patient does state that in the last 2 weeks he started noticing some tingling, pain in his feet bilaterally.  It is worse at the end of the day.  He denies any numbness, weakness in feet.  Denies any trauma or previous diagnosis of foot injury.  -States is meeting with ophthalmologist for dilated retinal exam the next few weeks.    Allergies   Allergen Reactions   • Pcn [Penicillins] Hives and Itching       Current medicines (including changes today)  Current Outpatient Medications   Medication Sig Dispense Refill   • ONETOUCH VERIO strip USE 1 STRIP TO CHECK GLUCOSE 4 TIMES DAILY     • gabapentin (NEURONTIN) 100 MG Cap Take 1 Cap by mouth 3 times a day for 30 days. 90 Cap 3   • atorvastatin (LIPITOR) 40 MG Tab Take 1 Tab by mouth every day for 90 days. 90 Tab 3   • Cinnamon 500 MG Cap Take 500 mg by mouth every day.     • cyanocobalamin (VITAMIN B-12) 500 MCG Tab Take 500 mcg by mouth every day.     • vitamin D (CHOLECALCIFEROL) 1000 Unit (25 mcg) Tab Take 2,000 Units by mouth every day.     • Alcohol Swabs Wipe site with prep pad prior to injection. 100 Each 0   • Lancets Use one One Touch Verio lancet to test blood sugar four times daily. 100 Each 0   • insulin glargine (INSULIN GLARGINE) 100 UNIT/ML Solution Pen-injector injection Inject 10 Units as instructed every evening for 360 days. 3 mL 11   • metformin (GLUCOPHAGE) 1000 MG  "tablet Take 1 Tab by mouth 2 times a day, with meals for 360 days. 180 Tab 3   • Insulin Pen Needle 32 G x 4 mm Use one pen needle in pen device to inject insulin four times daily. 100 Each 0   • Blood Glucose Test Strips Use one One Touch Verio strip to test blood sugar four times daily. 100 Strip 11     No current facility-administered medications for this visit.      He  has a past medical history of Diabetes (HCC), Hypertension, and Migraine headache.    ROS   Review of Systems   Constitutional: Negative for chills, fever and weight loss.   HENT: Negative for hearing loss.    Eyes: Negative for blurred vision.   Respiratory: Negative for shortness of breath and wheezing.    Cardiovascular: Negative for chest pain and palpitations.   Gastrointestinal: Negative for abdominal pain, diarrhea, nausea and vomiting.   Skin: Negative for rash.   Neurological: Positive for tingling.   All other systems reviewed and are negative.         Objective:     Physical Exam:  /80 (BP Location: Right arm, Patient Position: Sitting, BP Cuff Size: Adult)   Pulse 96   Temp 36.5 °C (97.7 °F) (Temporal)   Resp 12   Ht 1.778 m (5' 10\")   Wt 116.6 kg (257 lb)   SpO2 99%  Body mass index is 36.88 kg/m².  Constitutional: Alert, no distress.  Skin: Warm, dry, good turgor, no rashes in visible areas.  Eye: Equal, round and reactive, conjunctiva clear, lids normal.  ENMT: TM's clear bilaterally, lips without lesions, good dentition, oropharynx clear.  Neck: Trachea midline, no masses, no thyromegaly. No cervical or supraclavicular lymphadenopathy.  Respiratory: Unlabored respiratory effort, lungs clear to auscultation, no wheezes, no rhonchi.  Cardiovascular: Normal S1, S2, no murmur, no edema.  Abdomen: Soft, non-tender, no masses, no hepatosplenomegaly.  Psych: Alert and oriented x3, normal affect and mood.  Monofilament testing with a 10 gram force: sensation intact: intact bilaterally  Visual Inspection: Feet without " maceration, ulcers, fissures.  Pedal pulses: intact bilaterally      Assessment and Plan:     1. Uncontrolled type 2 diabetes mellitus with hyperglycemia (HCC)  -Status: Improving.  Blood sugar log that patient brought in shows drastic improvement from his previous numbers.  -At this time given that his fasting blood glucose is still averaging around 140 will increase the glargine from 10 units to 12 units.  He is instructed to continue increasing by 2 units every 5 to 7 days until fasting blood glucose is below 120.  -Also given history of diabetes as well as slightly elevated LDLs we will also start Lipitor 40 mg daily at this time.  -Monofilament exam was normal; however, patient is experiencing diabetic neuropathy (see below).  -We will continue with metformin, follow-up with diabetic educator nurse.  - atorvastatin (LIPITOR) 40 MG Tab; Take 1 Tab by mouth every day for 90 days.  Dispense: 90 Tab; Refill: 3  - Diabetic Monofilament LE Exam    2. Diabetic mononeuropathy associated with type 2 diabetes mellitus (HCC)  -Signs and symptoms at this time are consistent with a new problem of diabetic neuropathy.  -We will begin gabapentin therapy for treatment thereof.  We will start 100 mg.  Patient was instructed to take daily for 1 to 2 weeks and then increase to twice daily for 2 to 3 weeks then increase to 3 times daily.  -Patient will also discontinue magnesium supplement at this time to see if this also helps.  -We will follow-up in 3 months.  - gabapentin (NEURONTIN) 100 MG Cap; Take 1 Cap by mouth 3 times a day for 30 days.  Dispense: 90 Cap; Refill: 3    -Recheck A1c at next appointment.    Followup: Return in about 3 months (around 5/28/2020).         PLEASE NOTE: This dictation was created using voice recognition software. I have made every reasonable attempt to correct obvious errors, but I expect that there are errors of grammar and possibly content that I did not discover before finalizing the note.

## 2021-01-13 ENCOUNTER — HOSPITAL ENCOUNTER (OUTPATIENT)
Facility: MEDICAL CENTER | Age: 36
End: 2021-01-13
Attending: FAMILY MEDICINE
Payer: COMMERCIAL

## 2021-01-13 ENCOUNTER — OFFICE VISIT (OUTPATIENT)
Dept: URGENT CARE | Facility: PHYSICIAN GROUP | Age: 36
End: 2021-01-13
Payer: COMMERCIAL

## 2021-01-13 VITALS
RESPIRATION RATE: 14 BRPM | OXYGEN SATURATION: 95 % | WEIGHT: 255 LBS | HEIGHT: 70 IN | SYSTOLIC BLOOD PRESSURE: 130 MMHG | TEMPERATURE: 100 F | BODY MASS INDEX: 36.51 KG/M2 | HEART RATE: 96 BPM | DIASTOLIC BLOOD PRESSURE: 88 MMHG

## 2021-01-13 DIAGNOSIS — B34.9 VIRAL ILLNESS: Primary | ICD-10-CM

## 2021-01-13 DIAGNOSIS — B34.9 VIRAL ILLNESS: ICD-10-CM

## 2021-01-13 LAB
FLUAV+FLUBV AG SPEC QL IA: NORMAL
INT CON NEG: NORMAL
INT CON POS: NORMAL

## 2021-01-13 PROCEDURE — 87804 INFLUENZA ASSAY W/OPTIC: CPT | Performed by: FAMILY MEDICINE

## 2021-01-13 PROCEDURE — U0003 INFECTIOUS AGENT DETECTION BY NUCLEIC ACID (DNA OR RNA); SEVERE ACUTE RESPIRATORY SYNDROME CORONAVIRUS 2 (SARS-COV-2) (CORONAVIRUS DISEASE [COVID-19]), AMPLIFIED PROBE TECHNIQUE, MAKING USE OF HIGH THROUGHPUT TECHNOLOGIES AS DESCRIBED BY CMS-2020-01-R: HCPCS

## 2021-01-13 PROCEDURE — 99213 OFFICE O/P EST LOW 20 MIN: CPT | Performed by: FAMILY MEDICINE

## 2021-01-13 PROCEDURE — U0005 INFEC AGEN DETEC AMPLI PROBE: HCPCS

## 2021-01-13 NOTE — PROGRESS NOTES
"Subjective:      Trev Castillo is a 35 y.o. male who presents with Headache (Pt states he has been experiencing headache, fatigue, slight sore throat, slight cough. Sx started approximately 3 days ago.)            This is a new problem.  35-year-old was diabetes presented for evaluation of headache, fatigue, sore throat and slight cough and congestion for the past couple days.  No exposure to flu or Covid reported.  He has not received his flu shot this year.  Review of system otherwise negative.      Review of Systems   All other systems reviewed and are negative.         Objective:     /88   Pulse 96   Temp 37.8 °C (100 °F) (Temporal)   Resp 14   Ht 1.778 m (5' 10\")   Wt 115.7 kg (255 lb)   SpO2 95%   BMI 36.59 kg/m²      Physical Exam  Constitutional:       General: He is not in acute distress.     Appearance: He is not ill-appearing, toxic-appearing or diaphoretic.   HENT:      Head: Normocephalic and atraumatic.      Right Ear: Tympanic membrane, ear canal and external ear normal.      Left Ear: Tympanic membrane, ear canal and external ear normal.      Mouth/Throat:      Mouth: Mucous membranes are moist.      Pharynx: Oropharynx is clear. No oropharyngeal exudate or posterior oropharyngeal erythema.   Eyes:      Conjunctiva/sclera: Conjunctivae normal.   Neck:      Musculoskeletal: Neck supple.   Cardiovascular:      Rate and Rhythm: Normal rate and regular rhythm.      Heart sounds: No murmur. No friction rub. No gallop.    Pulmonary:      Effort: Pulmonary effort is normal. No respiratory distress.      Breath sounds: No stridor. No wheezing, rhonchi or rales.   Lymphadenopathy:      Cervical: No cervical adenopathy.   Skin:     General: Skin is warm.      Coloration: Skin is not jaundiced or pale.   Neurological:      Mental Status: He is alert and oriented to person, place, and time.   Psychiatric:         Mood and Affect: Mood normal.     Rapid flu is negative          Assessment/Plan:        1. " Viral illness  - POCT Influenza A/B  - COVID/SARS CoV-2 PCR; Future    Continue symptomatic care  Plan per orders and instructions  Warning signs reviewed

## 2021-01-13 NOTE — PATIENT INSTRUCTIONS
COVID-19  COVID-19 is a respiratory infection that is caused by a virus called severe acute respiratory syndrome coronavirus 2 (SARS-CoV-2). The disease is also known as coronavirus disease or novel coronavirus. In some people, the virus may not cause any symptoms. In others, it may cause a serious infection. The infection can get worse quickly and can lead to complications, such as:  · Pneumonia, or infection of the lungs.  · Acute respiratory distress syndrome or ARDS. This is fluid build-up in the lungs.  · Acute respiratory failure. This is a condition in which there is not enough oxygen passing from the lungs to the body.  · Sepsis or septic shock. This is a serious bodily reaction to an infection.  · Blood clotting problems.  · Secondary infections due to bacteria or fungus.  The virus that causes COVID-19 is contagious. This means that it can spread from person to person through droplets from coughs and sneezes (respiratory secretions).  What are the causes?  This illness is caused by a virus. You may catch the virus by:  · Breathing in droplets from an infected person's cough or sneeze.  · Touching something, like a table or a doorknob, that was exposed to the virus (contaminated) and then touching your mouth, nose, or eyes.  What increases the risk?  Risk for infection  You are more likely to be infected with this virus if you:  · Live in or travel to an area with a COVID-19 outbreak.  · Come in contact with a sick person who recently traveled to an area with a COVID-19 outbreak.  · Provide care for or live with a person who is infected with COVID-19.  Risk for serious illness  You are more likely to become seriously ill from the virus if you:  · Are 65 years of age or older.  · Have a long-term disease that lowers your body's ability to fight infection (immunocompromised).  · Live in a nursing home or long-term care facility.  · Have a long-term (chronic) disease such as:  ? Chronic lung disease, including  chronic obstructive pulmonary disease or asthma  ? Heart disease.  ? Diabetes.  ? Chronic kidney disease.  ? Liver disease.  · Are obese.  What are the signs or symptoms?  Symptoms of this condition can range from mild to severe. Symptoms may appear any time from 2 to 14 days after being exposed to the virus. They include:  · A fever.  · A cough.  · Difficulty breathing.  · Chills.  · Muscle pains.  · A sore throat.  · Loss of taste or smell.  Some people may also have stomach problems, such as nausea, vomiting, or diarrhea.  Other people may not have any symptoms of COVID-19.  How is this diagnosed?  This condition may be diagnosed based on:  · Your signs and symptoms, especially if:  ? You live in an area with a COVID-19 outbreak.  ? You recently traveled to or from an area where the virus is common.  ? You provide care for or live with a person who was diagnosed with COVID-19.  · A physical exam.  · Lab tests, which may include:  ? A nasal swab to take a sample of fluid from your nose.  ? A throat swab to take a sample of fluid from your throat.  ? A sample of mucus from your lungs (sputum).  ? Blood tests.  · Imaging tests, which may include, X-rays, CT scan, or ultrasound.  How is this treated?  At present, there is no medicine to treat COVID-19. Medicines that treat other diseases are being used on a trial basis to see if they are effective against COVID-19.  Your health care provider will talk with you about ways to treat your symptoms. For most people, the infection is mild and can be managed at home with rest, fluids, and over-the-counter medicines.  Treatment for a serious infection usually takes places in a hospital intensive care unit (ICU). It may include one or more of the following treatments. These treatments are given until your symptoms improve.  · Receiving fluids and medicines through an IV.  · Supplemental oxygen. Extra oxygen is given through a tube in the nose, a face mask, or a  bustamante.  · Positioning you to lie on your stomach (prone position). This makes it easier for oxygen to get into the lungs.  · Continuous positive airway pressure (CPAP) or bi-level positive airway pressure (BPAP) machine. This treatment uses mild air pressure to keep the airways open. A tube that is connected to a motor delivers oxygen to the body.  · Ventilator. This treatment moves air into and out of the lungs by using a tube that is placed in your windpipe.  · Tracheostomy. This is a procedure to create a hole in the neck so that a breathing tube can be inserted.  · Extracorporeal membrane oxygenation (ECMO). This procedure gives the lungs a chance to recover by taking over the functions of the heart and lungs. It supplies oxygen to the body and removes carbon dioxide.  Follow these instructions at home:  Lifestyle  · If you are sick, stay home except to get medical care. Your health care provider will tell you how long to stay home. Call your health care provider before you go for medical care.  · Rest at home as told by your health care provider.  · Do not use any products that contain nicotine or tobacco, such as cigarettes, e-cigarettes, and chewing tobacco. If you need help quitting, ask your health care provider.  · Return to your normal activities as told by your health care provider. Ask your health care provider what activities are safe for you.  General instructions  · Take over-the-counter and prescription medicines only as told by your health care provider.  · Drink enough fluid to keep your urine pale yellow.  · Keep all follow-up visits as told by your health care provider. This is important.  How is this prevented?    There is no vaccine to help prevent COVID-19 infection. However, there are steps you can take to protect yourself and others from this virus.  To protect yourself:   · Do not travel to areas where COVID-19 is a risk. The areas where COVID-19 is reported change often. To identify  high-risk areas and travel restrictions, check the [facility] travel website: wwwnc.cdc.gov/travel/notices  · If you live in, or must travel to, an area where COVID-19 is a risk, take precautions to avoid infection.  ? Stay away from people who are sick.  ? Wash your hands often with soap and water for 20 seconds. If soap and water are not available, use an alcohol-based hand .  ? Avoid touching your mouth, face, eyes, or nose.  ? Avoid going out in public, follow guidance from your state and local health authorities.  ? If you must go out in public, wear a cloth face covering or face mask.  ? Disinfect objects and surfaces that are frequently touched every day. This may include:  § Counters and tables.  § Doorknobs and light switches.  § Sinks and faucets.  § Electronics, such as phones, remote controls, keyboards, computers, and tablets.  To protect others:  If you have symptoms of COVID-19, take steps to prevent the virus from spreading to others.  · If you think you have a COVID-19 infection, contact your health care provider right away. Tell your health care team that you think you may have a COVID-19 infection.  · Stay home. Leave your house only to seek medical care. Do not use public transport.  · Do not travel while you are sick.  · Wash your hands often with soap and water for 20 seconds. If soap and water are not available, use alcohol-based hand .  · Stay away from other members of your household. Let healthy household members care for children and pets, if possible. If you have to care for children or pets, wash your hands often and wear a mask. If possible, stay in your own room, separate from others. Use a different bathroom.  · Make sure that all people in your household wash their hands well and often.  · Cough or sneeze into a tissue or your sleeve or elbow. Do not cough or sneeze into your hand or into the air.  · Wear a cloth face covering or face mask.  Where to find more  information  · Centers for Disease Control and Prevention: www.cdc.gov/coronavirus/2019-ncov/index.html  · World Health Organization: www.who.int/health-topics/coronavirus  Contact a health care provider if:  · You live in or have traveled to an area where COVID-19 is a risk and you have symptoms of the infection.  · You have had contact with someone who has COVID-19 and you have symptoms of the infection.  Get help right away if:  · You have trouble breathing.  · You have pain or pressure in your chest.  · You have confusion.  · You have bluish lips and fingernails.  · You have difficulty waking from sleep.  · You have symptoms that get worse.  These symptoms may represent a serious problem that is an emergency. Do not wait to see if the symptoms will go away. Get medical help right away. Call your local emergency services (911 in the [place] Do not drive yourself to the hospital. Let the emergency medical personnel know if you think you have COVID-19.  Summary  · COVID-19 is a respiratory infection that is caused by a virus. It is also known as coronavirus disease or novel coronavirus. It can cause serious infections, such as pneumonia, acute respiratory distress syndrome, acute respiratory failure, or sepsis.  · The virus that causes COVID-19 is contagious. This means that it can spread from person to person through droplets from coughs and sneezes.  · You are more likely to develop a serious illness if you are 65 years of age or older, have a weak immunity, live in a nursing home, or have chronic disease.  · There is no medicine to treat COVID-19. Your health care provider will talk with you about ways to treat your symptoms.  · Take steps to protect yourself and others from infection. Wash your hands often and disinfect objects and surfaces that are frequently touched every day. Stay away from people who are sick and wear a mask if you are sick.  This information is not intended to replace advice given to you by  your health care provider. Make sure you discuss any questions you have with your health care provider.  Document Released: 01/23/2020 Document Revised: 05/14/2020 Document Reviewed: 01/23/2020  Elsevier Patient Education © 2020 [employer]

## 2021-01-14 LAB
COVID ORDER STATUS COVID19: NORMAL
SARS-COV-2 RNA RESP QL NAA+PROBE: DETECTED
SPECIMEN SOURCE: ABNORMAL

## 2021-01-21 ENCOUNTER — OFFICE VISIT (OUTPATIENT)
Dept: URGENT CARE | Facility: PHYSICIAN GROUP | Age: 36
End: 2021-01-21
Payer: COMMERCIAL

## 2021-01-21 ENCOUNTER — APPOINTMENT (OUTPATIENT)
Dept: RADIOLOGY | Facility: IMAGING CENTER | Age: 36
End: 2021-01-21
Attending: NURSE PRACTITIONER
Payer: COMMERCIAL

## 2021-01-21 VITALS
HEIGHT: 70 IN | TEMPERATURE: 98.7 F | DIASTOLIC BLOOD PRESSURE: 80 MMHG | BODY MASS INDEX: 37.19 KG/M2 | SYSTOLIC BLOOD PRESSURE: 142 MMHG | HEART RATE: 83 BPM | WEIGHT: 259.8 LBS | RESPIRATION RATE: 18 BRPM | OXYGEN SATURATION: 98 %

## 2021-01-21 DIAGNOSIS — R06.02 SOB (SHORTNESS OF BREATH): ICD-10-CM

## 2021-01-21 DIAGNOSIS — U07.1 COVID-19 VIRUS INFECTION: ICD-10-CM

## 2021-01-21 PROCEDURE — 71046 X-RAY EXAM CHEST 2 VIEWS: CPT | Mod: TC,FY | Performed by: NURSE PRACTITIONER

## 2021-01-21 PROCEDURE — 99214 OFFICE O/P EST MOD 30 MIN: CPT | Mod: CS | Performed by: NURSE PRACTITIONER

## 2021-01-21 RX ORDER — ALBUTEROL SULFATE 90 UG/1
2 AEROSOL, METERED RESPIRATORY (INHALATION) EVERY 6 HOURS PRN
Qty: 8.5 G | Refills: 0 | Status: SHIPPED | OUTPATIENT
Start: 2021-01-21 | End: 2021-03-02

## 2021-01-21 ASSESSMENT — ENCOUNTER SYMPTOMS
MYALGIAS: 0
SHORTNESS OF BREATH: 1
HEADACHES: 0
COUGH: 1
EYE REDNESS: 0
DIZZINESS: 0
NECK PAIN: 0
VOMITING: 0
NAUSEA: 0
RHINORRHEA: 0
FEVER: 0
CHILLS: 0
SORE THROAT: 0
ABDOMINAL PAIN: 0

## 2021-01-21 NOTE — PROGRESS NOTES
Subjective:   Trev Castillo is a 35 y.o. male who presents for Shortness of Breath (COVID positive- having difficulty taking deep breaths )      URI   This is a new problem. Episode onset: tested +COVID 10 days. The problem has been gradually worsening. There has been no fever. Associated symptoms include coughing. Pertinent negatives include no abdominal pain, chest pain, congestion, dysuria, ear pain, headaches, nausea, neck pain, rash, rhinorrhea, sore throat or vomiting. Associated symptoms comments: Sob  . He has tried acetaminophen for the symptoms. The treatment provided no relief.       Review of Systems   Constitutional: Negative for chills and fever.   HENT: Negative for congestion, ear pain, rhinorrhea and sore throat.    Eyes: Negative for redness.   Respiratory: Positive for cough and shortness of breath.    Cardiovascular: Negative for chest pain.   Gastrointestinal: Negative for abdominal pain, nausea and vomiting.   Genitourinary: Negative for dysuria.   Musculoskeletal: Negative for myalgias and neck pain.   Skin: Negative for rash.   Neurological: Negative for dizziness and headaches.       Medications:    • albuterol Aers  • Alcohol Swabs  • Blood Glucose Test Strips  • Cinnamon Caps  • cyanocobalamin Tabs  • Insulin Pen Needle 32 G x 4 mm  • Lancets  • OneTouch Verio Strp  • vitamin D Tabs    Allergies: Pcn [penicillins]    Problem List: Trev Castillo has Migraine headache without aura; Low back pain; Morbid obesity with BMI of 40.0-44.9, adult (HCC); Diabetes type 2, uncontrolled ([facility]); Marijuana use; and Diabetic mononeuropathy associated with type 2 diabetes mellitus (HCC) on their problem list.    Surgical History:  Past Surgical History:   Procedure Laterality Date   • APPENDECTOMY         Past Social Hx: Trev Castillo  reports that he has never smoked. He has never used smokeless tobacco. He reports current alcohol use. He reports previous drug use. Drug: Marijuana.     Past Family Hx:  Trev Castillo  "family history includes Diabetes in his brother and mother; Other in his brother.     Problem list, medications, and allergies reviewed by myself today in Epic.     Objective:     /80   Pulse 83   Temp 37.1 °C (98.7 °F)   Resp 18   Ht 1.778 m (5' 10\")   Wt 117.8 kg (259 lb 12.8 oz)   SpO2 98%   BMI 37.28 kg/m²     Physical Exam  Vitals signs and nursing note reviewed.   Constitutional:       General: He is not in acute distress.     Appearance: He is well-developed.   HENT:      Head: Normocephalic and atraumatic.      Right Ear: External ear normal.      Left Ear: External ear normal.      Nose: Nose normal.      Mouth/Throat:      Mouth: Mucous membranes are moist.   Eyes:      Conjunctiva/sclera: Conjunctivae normal.   Cardiovascular:      Rate and Rhythm: Normal rate.   Pulmonary:      Effort: Pulmonary effort is normal. No respiratory distress.      Breath sounds: Normal breath sounds.   Abdominal:      General: There is no distension.   Musculoskeletal: Normal range of motion.   Skin:     General: Skin is warm and dry.   Neurological:      General: No focal deficit present.      Mental Status: He is alert and oriented to person, place, and time. Mental status is at baseline.      Gait: Gait (gait at baseline) normal.   Psychiatric:         Judgment: Judgment normal.         Assessment/Plan:     Diagnosis and associated orders:     1. SOB (shortness of breath)  DX-CHEST-2 VIEWS    albuterol 108 (90 Base) MCG/ACT Aero Soln inhalation aerosol   2. COVID-19 virus infection        Comments/MDM:     1.  Unremarkable two view chest.    Patient is a 35-year-old male present with stated above.  Patient did test positive for COVID-19 10 days ago pulse oxygenation 98% he is not tachypneic, lung sounds clear to auscultation bilaterally.  X-ray negative.  Will trial albuterol inhaler as needed.  Continue with self-isolation per the CDC guidelines for at least 14 days from onset of symptoms.  Differential " diagnosis, natural history, supportive care, and indications for immediate follow-up discussed.               Please note that this dictation was created using voice recognition software. I have made a reasonable attempt to correct obvious errors, but I expect that there are errors of grammar and possibly content that I did not discover before finalizing the note.    This note was electronically signed by Enoc SWAN.

## 2021-01-21 NOTE — LETTER
January 21, 2021         Patient: Trev Castillo   YOB: 1985   Date of Visit: 1/21/2021           To Whom it May Concern:    Trev Castillo was seen in my clinic on 1/21/2021.  Patient will need to continue self isolating per the CDC guidelines for another 4 days.     If you have any questions or concerns, please don't hesitate to call.        Sincerely,           CHERYL Carter.  Electronically Signed

## 2021-03-02 ENCOUNTER — OFFICE VISIT (OUTPATIENT)
Dept: URGENT CARE | Facility: PHYSICIAN GROUP | Age: 36
End: 2021-03-02
Payer: COMMERCIAL

## 2021-03-02 VITALS
TEMPERATURE: 97.8 F | HEIGHT: 70 IN | WEIGHT: 263 LBS | OXYGEN SATURATION: 97 % | SYSTOLIC BLOOD PRESSURE: 134 MMHG | BODY MASS INDEX: 37.65 KG/M2 | DIASTOLIC BLOOD PRESSURE: 100 MMHG | RESPIRATION RATE: 18 BRPM | HEART RATE: 88 BPM

## 2021-03-02 DIAGNOSIS — R41.840 DIFFICULTY CONCENTRATING: ICD-10-CM

## 2021-03-02 DIAGNOSIS — E11.65 TYPE 2 DIABETES MELLITUS WITH HYPERGLYCEMIA, UNSPECIFIED WHETHER LONG TERM INSULIN USE (HCC): ICD-10-CM

## 2021-03-02 DIAGNOSIS — R51.9 NONINTRACTABLE HEADACHE, UNSPECIFIED CHRONICITY PATTERN, UNSPECIFIED HEADACHE TYPE: ICD-10-CM

## 2021-03-02 DIAGNOSIS — R42 LIGHTHEADEDNESS: ICD-10-CM

## 2021-03-02 DIAGNOSIS — R39.9 URINARY SYMPTOM OR SIGN: ICD-10-CM

## 2021-03-02 LAB
APPEARANCE UR: CLEAR
BILIRUB UR STRIP-MCNC: NEGATIVE MG/DL
COLOR UR AUTO: YELLOW
GLUCOSE BLD-MCNC: 85 MG/DL (ref 70–100)
GLUCOSE UR STRIP.AUTO-MCNC: NEGATIVE MG/DL
HBA1C MFR BLD: 5.7 % (ref 0–5.6)
INT CON NEG: ABNORMAL
INT CON POS: ABNORMAL
KETONES UR STRIP.AUTO-MCNC: NEGATIVE MG/DL
LEUKOCYTE ESTERASE UR QL STRIP.AUTO: NEGATIVE
NITRITE UR QL STRIP.AUTO: NEGATIVE
PH UR STRIP.AUTO: 5.5 [PH] (ref 5–8)
PROT UR QL STRIP: NEGATIVE MG/DL
RBC UR QL AUTO: NEGATIVE
SP GR UR STRIP.AUTO: 1.01
UROBILINOGEN UR STRIP-MCNC: NEGATIVE MG/DL

## 2021-03-02 PROCEDURE — 83036 HEMOGLOBIN GLYCOSYLATED A1C: CPT | Performed by: FAMILY MEDICINE

## 2021-03-02 PROCEDURE — 82962 GLUCOSE BLOOD TEST: CPT | Performed by: FAMILY MEDICINE

## 2021-03-02 PROCEDURE — 81002 URINALYSIS NONAUTO W/O SCOPE: CPT | Performed by: FAMILY MEDICINE

## 2021-03-02 PROCEDURE — 99214 OFFICE O/P EST MOD 30 MIN: CPT | Performed by: FAMILY MEDICINE

## 2021-03-02 RX ORDER — KETOROLAC TROMETHAMINE 30 MG/ML
60 INJECTION, SOLUTION INTRAMUSCULAR; INTRAVENOUS ONCE
Status: COMPLETED | OUTPATIENT
Start: 2021-03-02 | End: 2021-03-02

## 2021-03-02 RX ADMIN — KETOROLAC TROMETHAMINE 60 MG: 30 INJECTION, SOLUTION INTRAMUSCULAR; INTRAVENOUS at 18:23

## 2021-03-03 NOTE — PROGRESS NOTES
"Chief Complaint:    Chief Complaint   Patient presents with   • Lightheadedness     difficulty concentrating    • Headache       History of Present Illness:    This is a new problem. For 3 days, he has had headache in front of head and trouble staying focused and concentrating when he works. Some photophobia. Had mild nasal symptoms and sore throat this AM, but has improved now. Some \"foamy\" urine x 1 week. No dysuria or urinary frequency. No fever or chills. No cough, vomiting, or diarrhea. No meds used for symptoms. He has history of Diabetes, but improved his Diabetes with lifestyle changes and is not on Diabetes meds at this time. He checks his blood sugar daily. He does not check his BP outside of office. Currently not on any BP meds. Has history of migraine headaches, treated with Toradol IM for headache 3/11/14 which looks like it helped based on chart review.      Review of Systems:    Constitutional: Negative for fever, chills, and diaphoresis.   Eyes: See HPI. Negative for change in vision, pain, redness, and discharge.  ENT: See HPI.  Respiratory: Negative for cough, hemoptysis, sputum production, shortness of breath, wheezing, and stridor.    Cardiovascular: Negative for chest pain, palpitations, orthopnea, claudication, leg swelling, and PND.   Gastrointestinal: Negative for abdominal pain, nausea, vomiting, diarrhea, constipation, blood in stool, and melena.   Genitourinary: See HPI.  Musculoskeletal: Negative for myalgias, joint pain, neck pain, and back pain.   Skin: Negative for rash and itching.   Neurological: See HPI.   Endo: See HPI.  Heme: Does not bruise/bleed easily.   Psychiatric/Behavioral: Negative for depression, suicidal ideas, hallucinations, memory loss and substance abuse. The patient is not nervous/anxious and does not have insomnia.        Past Medical History:    Past Medical History:   Diagnosis Date   • Diabetes (HCC)    • Hypertension    • Migraine headache      Past Surgical " History:    Past Surgical History:   Procedure Laterality Date   • APPENDECTOMY       Social History:    Social History     Socioeconomic History   • Marital status: Unknown     Spouse name: Not on file   • Number of children: Not on file   • Years of education: Not on file   • Highest education level: Not on file   Occupational History   • Not on file   Tobacco Use   • Smoking status: Never Smoker   • Smokeless tobacco: Never Used   Substance and Sexual Activity   • Alcohol use: Yes     Comment: Every now and then   • Drug use: Not Currently     Types: Marijuana     Comment: 1 x every 3 weeks   • Sexual activity: Yes     Partners: Female     Comment: Wife   Other Topics Concern   • Not on file   Social History Narrative   • Not on file     Social Determinants of Health     Financial Resource Strain:    • Difficulty of Paying Living Expenses:    Food Insecurity:    • Worried About Running Out of Food in the Last Year:    • Ran Out of Food in the Last Year:    Transportation Needs:    • Lack of Transportation (Medical):    • Lack of Transportation (Non-Medical):    Physical Activity:    • Days of Exercise per Week:    • Minutes of Exercise per Session:    Stress:    • Feeling of Stress :    Social Connections:    • Frequency of Communication with Friends and Family:    • Frequency of Social Gatherings with Friends and Family:    • Attends Hindu Services:    • Active Member of Clubs or Organizations:    • Attends Club or Organization Meetings:    • Marital Status:    Intimate Partner Violence:    • Fear of Current or Ex-Partner:    • Emotionally Abused:    • Physically Abused:    • Sexually Abused:      Family History:    Family History   Problem Relation Age of Onset   • Other Brother         migraine   • Diabetes Mother    • Diabetes Brother      Medications:    No current outpatient medications on file prior to visit.     No current facility-administered medications on file prior to visit.  "    Allergies:    Allergies   Allergen Reactions   • Pcn [Penicillins] Hives and Itching       Vitals:    Vitals:    03/02/21 1723   BP: 134/100   Pulse: 88   Resp: 18   Temp: 36.6 °C (97.8 °F)   SpO2: 97%   Weight: 119 kg (263 lb)   Height: 1.778 m (5' 10\")       Physical Exam:    Constitutional: Vital signs reviewed. Appears well-developed and well-nourished. No acute distress.   Eyes: Sclera white, conjunctivae clear. PERRLA.  ENT: External ears normal. External auditory canals normal without discharge. TMs translucent and non-bulging. Hearing normal. Nasal mucosa pink. Lips/teeth are normal. Oral mucosa pink and moist. Posterior pharynx: WNL.  Neck: Neck supple.   Cardiovascular: Regular rate and rhythm. No murmur.  Pulmonary/Chest: Respirations non-labored. Clear to auscultation bilaterally.  Lymph: Cervical nodes without tenderness or enlargement.  Musculoskeletal: Normal gait. Normal range of motion. No muscular atrophy or weakness.  Neurological: Alert and oriented to person, place, and time. CN 2-12 intact. Muscle tone normal. Coordination normal.   Skin: No rashes or lesions. Warm, dry, normal turgor.  Psychiatric: Normal mood and affect. Behavior is normal. Judgment and thought content normal.       Diagnostics:    POCT glucose  Order: [id number]  Status:  Final result   Visible to patient:  No (scheduled for 3/4/2021  5:33 AM) Next appt:  None Dx:  Lightheadedness; Difficulty concentra...   Ref Range & Units  5:33 PM 3 yr ago   Glucose - Accu-Ck 70 - 100 mg/dL 85  113Abnormal           Specimen Collected: 03/02/21  5:33 PM Last Resulted: 03/02/21  5:33 PM           POCT Hemoglobin A1C  Order: [id number]  Status:  Final result   Visible to patient:  No (scheduled for 3/4/2021  5:33 AM) Next appt:  None Dx:  Lightheadedness; Difficulty concentra...   Ref Range & Units  5:33 PM 1 mo ago 1 yr ago 2 yr ago   Glycohemoglobin 0.0 - 5.6 % 5.7Abnormal    12.9Abnormal   6.2 R    Internal Control Negative   Valid  " Negative  Valid    Internal Control Positive   Valid  Positive  Valid          Specimen Collected: 03/02/21  5:33 PM Last Resulted: 03/02/21  5:33 PM           POCT Urinalysis  Order: [id number]  Status:  Final result   Visible to patient:  Kailyn (scheduled for 3/4/2021  6:10 AM) Next appt:  None Dx:  Urinary symptom or sign   Ref Range & Units  6:10 PM 6 yr ago   POC Color Negative YELLOW      POC Appearance Negative CLEAR      POC Leukocyte Esterase Negative NEGATIVE  Neg    POC Nitrites Negative NEGATIVE  Neg    POC Urobiligen Negative (0.2) mg/dL NEGATIVE  Neg    POC Protein Negative mg/dL NEGATIVE  Trace    POC Urine PH 5.0 - 8.0 5.5  7    POC Blood Negative NEGATIVE  Neg    POC Specific Gravity <1.005 - >1.030 1.015  1.015    POC Ketones Negative mg/dL NEGATIVE  Neg    POC Bilirubin Negative mg/dL NEGATIVE  Neg    POC Glucose Negative mg/dL NEGATIVE  Neg          Specimen Collected: 03/02/21  6:10 PM Last Resulted: 03/02/21  6:10 PM             Assessment / Plan:    1. Nonintractable headache, unspecified chronicity pattern, unspecified headache type  - ketorolac (TORADOL) injection 60 mg    2. Lightheadedness  - POCT Hemoglobin A1C  - POCT glucose    3. Difficulty concentrating  - POCT Hemoglobin A1C  - POCT glucose    4. Urinary symptom or sign  - POCT Urinalysis    5. Type 2 diabetes mellitus with hyperglycemia, unspecified whether long term insulin use (HCC)  - POCT Hemoglobin A1C  - POCT glucose      Discussed with him DDX, management options, and risks, benefits, and alternatives to treatment plan agreed upon.    ? etiology of symptoms. In-office tests are non-revealing for cause of symptoms. DBP is elevated at 100, but I do not think this is the cause of his symptoms. DBP may be up due to him having his symptoms. I suggested Migraine Headache as possible cause of symptoms. He reports his symptoms feel like a Migraine, but the headache is not as intense as his usual migraines.    He was treated with Toradol  IM for headache 3/11/14 which looks like it helped based on chart review and thus I offered to treat with same today. He is agreeable.    Agreeable to medication given.    Discussed expected course of duration, time for improvement, and to seek follow-up in Emergency Room, urgent care, or with PCP if getting worse at any time or not improving within expected time frame.

## 2021-03-08 ENCOUNTER — OFFICE VISIT (OUTPATIENT)
Dept: URGENT CARE | Facility: PHYSICIAN GROUP | Age: 36
End: 2021-03-08
Payer: COMMERCIAL

## 2021-03-08 VITALS
HEIGHT: 70 IN | TEMPERATURE: 98.9 F | WEIGHT: 266 LBS | RESPIRATION RATE: 16 BRPM | DIASTOLIC BLOOD PRESSURE: 100 MMHG | HEART RATE: 88 BPM | SYSTOLIC BLOOD PRESSURE: 136 MMHG | OXYGEN SATURATION: 97 % | BODY MASS INDEX: 38.08 KG/M2

## 2021-03-08 DIAGNOSIS — R42 VERTIGO: ICD-10-CM

## 2021-03-08 DIAGNOSIS — R11.0 NAUSEA: ICD-10-CM

## 2021-03-08 DIAGNOSIS — R42 LIGHTHEADEDNESS: ICD-10-CM

## 2021-03-08 PROCEDURE — 99214 OFFICE O/P EST MOD 30 MIN: CPT | Performed by: PHYSICIAN ASSISTANT

## 2021-03-08 RX ORDER — ONDANSETRON 4 MG/1
4 TABLET, ORALLY DISINTEGRATING ORAL EVERY 8 HOURS PRN
Qty: 20 TABLET | Refills: 0 | Status: SHIPPED | OUTPATIENT
Start: 2021-03-08 | End: 2022-05-11

## 2021-03-09 RX ORDER — MECLIZINE HYDROCHLORIDE 25 MG/1
25 TABLET ORAL 3 TIMES DAILY PRN
Qty: 20 TABLET | Refills: 0 | Status: SHIPPED | OUTPATIENT
Start: 2021-03-09 | End: 2022-05-11

## 2021-03-09 NOTE — PROGRESS NOTES
Chief Complaint   Patient presents with   • Other     Was seen her by Dr. Rahman, feels like nausea is getting worse, dizziness and lightheadedness is still the same        HISTORY OF PRESENT ILLNESS: Patient is a 35 y.o. male who presents today for the following:    Seen 3/2 for the same; total symptom time is about 1 week  HA resolved with toradol injection 3/2  Nausea remains, slightly worse  Still having dizziness, light headedness, difficulty concentrating  A1C 5.7, glucose 85 in UC    Patient Active Problem List    Diagnosis Date Noted   • Diabetic mononeuropathy associated with type 2 diabetes mellitus ([facility]) 02/28/2020   • Diabetes type 2, uncontrolled ([facility]) 10/11/2018   • Marijuana use 10/11/2018   • Morbid obesity with BMI of 40.0-44.9, adult ([facility]) 01/15/2018   • Low back pain 08/29/2014   • Migraine headache without aura 08/06/2014       Allergies:Pcn [penicillins]    Current Outpatient Medications Ordered in Epic   Medication Sig Dispense Refill   • meclizine (ANTIVERT) 25 MG Tab Take 1 tablet by mouth 3 times a day as needed for Vertigo. 20 tablet 0   • ondansetron (ZOFRAN ODT) 4 MG TABLET DISPERSIBLE Take 1 tablet by mouth every 8 hours as needed for Nausea. 20 tablet 0     No current Epic-ordered facility-administered medications on file.       Past Medical History:   Diagnosis Date   • Diabetes ([facility])    • Hypertension    • Migraine headache        Social History     Tobacco Use   • Smoking status: Never Smoker   • Smokeless tobacco: Never Used   Substance Use Topics   • Alcohol use: Yes     Comment: Every now and then   • Drug use: Not Currently     Types: Marijuana     Comment: 1 x every 3 weeks       Family Status   Relation Name Status   • Bro  (Not Specified)   • Cm Edgar Anna (Not Specified)   • Herson Fernandez Anna (Not Specified)     Family History   Problem Relation Age of Onset   • Other Brother         migraine   • Diabetes Mother    • Diabetes Brother        Review of Systems:    Constitutional  "ROS: No unexpected change in weight, No weakness, No fatigue  Eye ROS: No recent significant change in vision, No eye pain, redness, discharge  Ear ROS: + vertigo  Mouth/Throat ROS: No teeth or gum problems, No bleeding gums, No tongue complaints  Neck ROS: No swollen glands, No significant pain in neck  Pulmonary ROS: No chronic cough, sputum, or hemoptysis, No dyspnea on exertion, No wheezing  Cardiovascular ROS: No diaphoresis, No edema, No palpitations  Musculoskeletal/Extremities ROS: No peripheral edema, No pain, redness or swelling on the joints  Hematologic/Lymphatic ROS: No chills, No night sweats, No weight loss  Skin/Integumentary ROS: No edema, No evidence of rash, No itching    Exam:  /100   Pulse 88   Temp 37.2 °C (98.9 °F) (Temporal)   Resp 16   Ht 1.778 m (5' 10\")   Wt 121 kg (266 lb)   SpO2 97%   General: Well developed, well nourished. No distress.    Eye: PERRL/EOMI; conjunctivae clear, lids normal.  ENMT: Lips without lesions, MMM. Oropharynx is clear. Bilateral TMs are within normal limits. Moderate cerumen bilaterally (still able to visualize each TM).  Pulmonary: Unlabored respiratory effort. Lungs clear to auscultation, no wheezes, no rhonchi.    Cardiovascular: Regular rate and rhythm without murmur.   Neurologic: Grossly nonfocal. No facial asymmetry noted.  Lymph: No cervical lymphadenopathy noted.  Skin: Warm, dry, good turgor. No rashes in visible areas.   Psych: Normal mood. Alert and oriented to person, place and time.    Assessment/Plan:  Symptoms overall seem worse to the patient. Exam is unremarkable.  Use all medication as directed. Labs/CT provided to have done as an outpatient should symptoms persist. Discussed red flags and ER precautions. Follow up for worsening or persistent symptoms.  1. Vertigo  CBC WITH DIFFERENTIAL    Comp Metabolic Panel    FREE THYROXINE    TSH    CT-HEAD W/O    meclizine (ANTIVERT) 25 MG Tab   2. Lightheadedness  CBC WITH DIFFERENTIAL    " Comp Metabolic Panel    FREE THYROXINE    TSH    CT-HEAD W/O   3. Nausea  CBC WITH DIFFERENTIAL    Comp Metabolic Panel    FREE THYROXINE    TSH    CT-HEAD W/O    ondansetron (ZOFRAN ODT) 4 MG TABLET DISPERSIBLE

## 2022-02-10 ENCOUNTER — OFFICE VISIT (OUTPATIENT)
Dept: URGENT CARE | Facility: PHYSICIAN GROUP | Age: 37
End: 2022-02-10
Payer: COMMERCIAL

## 2022-02-10 VITALS
HEART RATE: 106 BPM | WEIGHT: 260 LBS | HEIGHT: 70 IN | RESPIRATION RATE: 16 BRPM | TEMPERATURE: 97.9 F | DIASTOLIC BLOOD PRESSURE: 90 MMHG | OXYGEN SATURATION: 98 % | SYSTOLIC BLOOD PRESSURE: 122 MMHG | BODY MASS INDEX: 37.22 KG/M2

## 2022-02-10 DIAGNOSIS — R42 DIZZINESS: ICD-10-CM

## 2022-02-10 DIAGNOSIS — M62.830 BACK MUSCLE SPASM: ICD-10-CM

## 2022-02-10 DIAGNOSIS — E11.65 UNCONTROLLED TYPE 2 DIABETES MELLITUS WITH HYPERGLYCEMIA (HCC): ICD-10-CM

## 2022-02-10 PROCEDURE — 99214 OFFICE O/P EST MOD 30 MIN: CPT | Performed by: FAMILY MEDICINE

## 2022-02-10 NOTE — PROGRESS NOTES
"  Subjective:      36 y.o. male presents to urgent care for a couple different symptoms.  He is experiencing dizziness and lightheadedness since his diagnosis of COVID 1/13/2022. Symptoms have remained relatively unchanged over the last month. No change in symptoms with position changes or closing his eyes. No associated weakness.  He does have a history of dizziness for which he has used meclizine in the past.  He also has a history of diabetes that is not currently being treated.  He does check his blood sugars on a regular basis, the highest numbers are in the 130s.    He also notes mid back pain that started yesterday.  He denies any inciting event or trauma.  The pain is constant, but worse with certain movements, is described as both dull and sharp, currently rated 5/10.  He has not taken any medication for the pain.  No associated numbness or weakness to his legs.  No loss of bowel or bladder function.    He denies any other questions or concerns at this time.    Current problem list, medication, and past medical/surgical history were reviewed in Epic.    ROS  See HPI     Objective:      /90   Pulse (!) 106   Temp 36.6 °C (97.9 °F) (Temporal)   Resp 16   Ht 1.778 m (5' 10\")   Wt 118 kg (260 lb)   SpO2 98%   BMI 37.31 kg/m²     Physical Exam  Constitutional:       General: He is not in acute distress.     Appearance: He is not diaphoretic.   Eyes:      Extraocular Movements: Extraocular movements intact.      Right eye: No nystagmus.      Left eye: No nystagmus.      Pupils: Pupils are equal, round, and reactive to light.   Cardiovascular:      Rate and Rhythm: Normal rate and regular rhythm.      Heart sounds: Normal heart sounds.   Pulmonary:      Effort: Pulmonary effort is normal. No respiratory distress.      Breath sounds: Normal breath sounds.   Musculoskeletal:      Comments: No discolorations or deformities noted to inspection of his back. No step offs or areas of tenderness to palpation " of his spine. He is tender to palpation of para-spinal muscles bilaterally in the thoracic region.    Neurological:      Mental Status: He is alert.      Comments: Cranial nerves II through XII grossly intact.  Equal strength and sensation to extremities x4.  Negative Romberg.  Normal finger-to-nose test.   Psychiatric:         Mood and Affect: Affect normal.         Judgment: Judgment normal.       Assessment/Plan:     1. Dizziness  2. Uncontrolled type 2 diabetes mellitus with hyperglycemia (HCC)  Unsure of etiology. No acute neurological abnormalities on physical exam today. Diabetes, although uncontrolled, is unlikely to be contributing giving the lower glucose. He does have Meclizine at home which he will trial.  - Referral to establish with [facility]CP    3. Back muscle spasm  Patient given a handout on back exercises.  He was encouraged to use Tylenol, ibuprofen, and heat as needed for symptomatic relief.  - Referral to establish with [facility]CP      Instructed to return to Urgent Care or nearest Emergency Department if symptoms fail to improve, for any change in condition, further concerns, or new concerning symptoms. Patient states understanding of the plan of care and discharge instructions.    Dorys Álvarez M.D.

## 2022-05-11 ENCOUNTER — OFFICE VISIT (OUTPATIENT)
Dept: MEDICAL GROUP | Facility: MEDICAL CENTER | Age: 37
End: 2022-05-11
Payer: COMMERCIAL

## 2022-05-11 VITALS
WEIGHT: 288.4 LBS | SYSTOLIC BLOOD PRESSURE: 124 MMHG | OXYGEN SATURATION: 96 % | HEART RATE: 90 BPM | HEIGHT: 70 IN | DIASTOLIC BLOOD PRESSURE: 82 MMHG | BODY MASS INDEX: 41.29 KG/M2 | TEMPERATURE: 97.3 F

## 2022-05-11 DIAGNOSIS — E66.01 MORBID OBESITY WITH BMI OF 40.0-44.9, ADULT (HCC): Chronic | ICD-10-CM

## 2022-05-11 DIAGNOSIS — E11.65 UNCONTROLLED TYPE 2 DIABETES MELLITUS WITH HYPERGLYCEMIA (HCC): ICD-10-CM

## 2022-05-11 DIAGNOSIS — R42 DIZZINESS: ICD-10-CM

## 2022-05-11 PROBLEM — E11.41 DIABETIC MONONEUROPATHY ASSOCIATED WITH TYPE 2 DIABETES MELLITUS (HCC): Chronic | Status: ACTIVE | Noted: 2020-02-28

## 2022-05-11 LAB
HBA1C MFR BLD: 8.6 % (ref 0–5.6)
INT CON NEG: NEGATIVE
INT CON POS: POSITIVE

## 2022-05-11 PROCEDURE — 99214 OFFICE O/P EST MOD 30 MIN: CPT | Performed by: FAMILY MEDICINE

## 2022-05-11 PROCEDURE — 83036 HEMOGLOBIN GLYCOSYLATED A1C: CPT | Performed by: FAMILY MEDICINE

## 2022-05-11 RX ORDER — METFORMIN HYDROCHLORIDE 500 MG/1
1000 TABLET, EXTENDED RELEASE ORAL 2 TIMES DAILY
Qty: 120 TABLET | Refills: 0 | Status: SHIPPED | OUTPATIENT
Start: 2022-05-11

## 2022-05-11 ASSESSMENT — ENCOUNTER SYMPTOMS
BLURRED VISION: 1
TINGLING: 0
POLYDIPSIA: 0
HEADACHES: 1
DIZZINESS: 1
WEIGHT LOSS: 0

## 2022-05-11 ASSESSMENT — PATIENT HEALTH QUESTIONNAIRE - PHQ9: CLINICAL INTERPRETATION OF PHQ2 SCORE: 0

## 2022-05-11 NOTE — ASSESSMENT & PLAN NOTE
A1c 8.6.  Previous high was 12.  Patient was able to not need metformin and insulin with diet and weight loss but that has gone by the Marky and COVID's wake.  Had good conversation with patient about good sustainable weight loss 1 to 2 pounds a week and finding dietary modifications that he finds that he can live with.    Will restart metformin 500 mg daily with incremental increase weekly until we get to 1000 mg twice daily.    Patient to get labs done  Discussed that we may need to add a medication to at next visit i.e. statin and ARB

## 2022-05-11 NOTE — ASSESSMENT & PLAN NOTE
Discussed with patient finding a sustainable way to lose weight.  Recommend only looking for 1 to 2 pounds a week come down nice and slow and controlled.  Fad diets though you can lose weight will not be as useful long-term and he is agreeable with this.

## 2022-05-11 NOTE — ASSESSMENT & PLAN NOTE
Orthostatic vital signs did not show any concerning abnormalities and patient did not seem to get symptomatic.  We will continue with ENT recs and appreciate their help with this difficult diagnosis to work-up and treat.

## 2022-05-11 NOTE — PROGRESS NOTES
"Subjective:     CC:  Diagnoses of Uncontrolled type 2 diabetes mellitus with hyperglycemia (HCC), Dizziness, and Morbid obesity with BMI of 40.0-44.9, adult ([facility]) were pertinent to this visit.    HISTORY OF THE PRESENT ILLNESS: Patient is a 36 y.o. male. This pleasant patient is here today to establish care and discuss general health and diabetes.     Seeing ENT for headaches and dizziness.  No longer taking meclizine.  They are going to scan his head soon.    Diabetes  Has been on metformin for a while and insulin as well. Lost weight down to 230 pounds and was able to go off medication. Then Covid happened and he was lost to follow up. 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  Traveling also makes it difficult to eat healthy sometimes.    To lose weight in the past for 3 months he did low carb, no milk, no soy diet.  He does not feel it is sustainable for long-term use.  Also during COVID he regained weight.      Problem   Dizziness   Diabetic Mononeuropathy Associated With Type 2 Diabetes Mellitus (Hcc)   Diabetes Type 2, Uncontrolled (Hcc)   Morbid obesity with BMI of 40.0-44.9, adult ([facility])    5/11/2022: 288 pounds or 131 kg with a BMI of 41.3         Current Outpatient Medications Ordered in Epic   Medication Sig Dispense Refill   • metFORMIN ER (GLUCOPHAGE XR) 500 MG TABLET SR 24 HR Take 2 Tablets by mouth in the morning and 2 Tablets in the evening. 120 Tablet 0     No current Epic-ordered facility-administered medications on file.       Health Maintenance: Diabetic labs    ROS:   Review of Systems   Constitutional: Negative for weight loss.   Eyes: Positive for blurred vision.   Genitourinary: Negative for frequency.   Neurological: Positive for dizziness and headaches. Negative for tingling.   Endo/Heme/Allergies: Negative for polydipsia.         Objective:       Exam: /82   Pulse 90   Temp 36.3 °C (97.3 °F) (Temporal)   Ht 1.778 m (5' 10\")   Wt (!) 131 kg (288 lb 6.4 " oz)   SpO2 96%  Body mass index is 41.38 kg/m².    Physical Exam  Vitals reviewed.   Constitutional:       Appearance: Normal appearance.   HENT:      Head: Normocephalic and atraumatic.   Cardiovascular:      Rate and Rhythm: Normal rate and regular rhythm.      Heart sounds: Normal heart sounds.   Pulmonary:      Effort: Pulmonary effort is normal.      Breath sounds: Normal breath sounds.   Neurological:      Mental Status: He is alert. Mental status is at baseline.   Psychiatric:         Mood and Affect: Mood normal.         Behavior: Behavior normal.         Labs: Point-of-care A1c 8.6    Assessment & Plan:   36 y.o. male with the following -    Problem List Items Addressed This Visit     Morbid obesity with BMI of 40.0-44.9, adult (HCC) (Chronic)     Discussed with patient finding a sustainable way to lose weight.  Recommend only looking for 1 to 2 pounds a week come down nice and slow and controlled.  Fad diets though you can lose weight will not be as useful long-term and he is agreeable with this.           Relevant Medications    metFORMIN ER (GLUCOPHAGE XR) 500 MG TABLET SR 24 HR    Other Relevant Orders    Patient identified as having weight management issue.  Appropriate orders and counseling given.    Diabetes type 2, uncontrolled (HCC) (Chronic)     A1c 8.6.  Previous high was 12.  Patient was able to not need metformin and insulin with diet and weight loss but that has gone by the Marky and COVID's wake.  Had good conversation with patient about good sustainable weight loss 1 to 2 pounds a week and finding dietary modifications that he finds that he can live with.    Will restart metformin 500 mg daily with incremental increase weekly until we get to 1000 mg twice daily.    Patient to get labs done  Discussed that we may need to add a medication to at next visit i.e. statin and ARB           Relevant Medications    metFORMIN ER (GLUCOPHAGE XR) 500 MG TABLET SR 24 HR    Other Relevant Orders     POCT  A1C (Completed)    Comp Metabolic Panel    CBC WITHOUT DIFFERENTIAL    Lipid Profile    MICROALBUMIN CREAT RATIO URINE    Dizziness     Orthostatic vital signs did not show any concerning abnormalities and patient did not seem to get symptomatic.  We will continue with ENT recs and appreciate their help with this difficult diagnosis to work-up and treat.           Relevant Orders    Orthostatic Blood Pressure          Return in about 4 weeks (around 6/8/2022) for Medication F/U, Diabetes F/U.    Please note that this dictation was created using voice recognition software. I have made every reasonable attempt to correct obvious errors, but I expect that there are errors of grammar and possibly content that I did not discover before finalizing the note.

## 2022-05-11 NOTE — PATIENT INSTRUCTIONS
Metformin  Week 1: 1 tab in the morning  Week 2: 1 tab in the morning and 1 tab in the evening  Week 3: 2 tabs in the morning and 1 tab in the evening  Week 4: 2 tabs twice a day

## 2022-06-08 ENCOUNTER — HOSPITAL ENCOUNTER (OUTPATIENT)
Dept: LAB | Facility: MEDICAL CENTER | Age: 37
End: 2022-06-08
Attending: FAMILY MEDICINE
Payer: COMMERCIAL

## 2022-06-08 DIAGNOSIS — E11.65 UNCONTROLLED TYPE 2 DIABETES MELLITUS WITH HYPERGLYCEMIA (HCC): ICD-10-CM

## 2022-06-08 LAB
ALBUMIN SERPL BCP-MCNC: 4.8 G/DL (ref 3.2–4.9)
ALBUMIN/GLOB SERPL: 1.5 G/DL
ALP SERPL-CCNC: 81 U/L (ref 30–99)
ALT SERPL-CCNC: 38 U/L (ref 2–50)
ANION GAP SERPL CALC-SCNC: 12 MMOL/L (ref 7–16)
AST SERPL-CCNC: 26 U/L (ref 12–45)
BILIRUB SERPL-MCNC: 0.5 MG/DL (ref 0.1–1.5)
BUN SERPL-MCNC: 14 MG/DL (ref 8–22)
CALCIUM SERPL-MCNC: 9.6 MG/DL (ref 8.5–10.5)
CHLORIDE SERPL-SCNC: 102 MMOL/L (ref 96–112)
CHOLEST SERPL-MCNC: 207 MG/DL (ref 100–199)
CO2 SERPL-SCNC: 23 MMOL/L (ref 20–33)
CREAT SERPL-MCNC: 0.9 MG/DL (ref 0.5–1.4)
CREAT UR-MCNC: 154.33 MG/DL
ERYTHROCYTE [DISTWIDTH] IN BLOOD BY AUTOMATED COUNT: 43.2 FL (ref 35.9–50)
FASTING STATUS PATIENT QL REPORTED: NORMAL
GFR SERPLBLD CREATININE-BSD FMLA CKD-EPI: 112 ML/MIN/1.73 M 2
GLOBULIN SER CALC-MCNC: 3.1 G/DL (ref 1.9–3.5)
GLUCOSE SERPL-MCNC: 128 MG/DL (ref 65–99)
HCT VFR BLD AUTO: 45.8 % (ref 42–52)
HDLC SERPL-MCNC: 39 MG/DL
HGB BLD-MCNC: 15.2 G/DL (ref 14–18)
LDLC SERPL CALC-MCNC: 152 MG/DL
MCH RBC QN AUTO: 28.6 PG (ref 27–33)
MCHC RBC AUTO-ENTMCNC: 33.2 G/DL (ref 33.7–35.3)
MCV RBC AUTO: 86.1 FL (ref 81.4–97.8)
MICROALBUMIN UR-MCNC: 1.4 MG/DL
MICROALBUMIN/CREAT UR: 9 MG/G (ref 0–30)
PLATELET # BLD AUTO: 183 K/UL (ref 164–446)
PMV BLD AUTO: 13.2 FL (ref 9–12.9)
POTASSIUM SERPL-SCNC: 4.3 MMOL/L (ref 3.6–5.5)
PROT SERPL-MCNC: 7.9 G/DL (ref 6–8.2)
RBC # BLD AUTO: 5.32 M/UL (ref 4.7–6.1)
SODIUM SERPL-SCNC: 137 MMOL/L (ref 135–145)
TRIGL SERPL-MCNC: 82 MG/DL (ref 0–149)
WBC # BLD AUTO: 7.8 K/UL (ref 4.8–10.8)

## 2022-06-08 PROCEDURE — 85027 COMPLETE CBC AUTOMATED: CPT

## 2022-06-08 PROCEDURE — 82570 ASSAY OF URINE CREATININE: CPT

## 2022-06-08 PROCEDURE — 80053 COMPREHEN METABOLIC PANEL: CPT

## 2022-06-08 PROCEDURE — 82043 UR ALBUMIN QUANTITATIVE: CPT

## 2022-06-08 PROCEDURE — 80061 LIPID PANEL: CPT

## 2022-06-08 PROCEDURE — 36415 COLL VENOUS BLD VENIPUNCTURE: CPT

## 2022-06-10 ENCOUNTER — OFFICE VISIT (OUTPATIENT)
Dept: MEDICAL GROUP | Facility: MEDICAL CENTER | Age: 37
End: 2022-06-10
Payer: COMMERCIAL

## 2022-06-10 VITALS
DIASTOLIC BLOOD PRESSURE: 86 MMHG | OXYGEN SATURATION: 94 % | TEMPERATURE: 97.1 F | HEIGHT: 70 IN | SYSTOLIC BLOOD PRESSURE: 130 MMHG | WEIGHT: 276 LBS | HEART RATE: 94 BPM | BODY MASS INDEX: 39.51 KG/M2

## 2022-06-10 DIAGNOSIS — F41.0 ANXIETY ATTACK: ICD-10-CM

## 2022-06-10 DIAGNOSIS — E11.65 UNCONTROLLED TYPE 2 DIABETES MELLITUS WITH HYPERGLYCEMIA (HCC): Chronic | ICD-10-CM

## 2022-06-10 DIAGNOSIS — E78.5 HYPERLIPIDEMIA DUE TO TYPE 2 DIABETES MELLITUS (HCC): ICD-10-CM

## 2022-06-10 DIAGNOSIS — G43.009 MIGRAINE WITHOUT AURA AND WITHOUT STATUS MIGRAINOSUS, NOT INTRACTABLE: ICD-10-CM

## 2022-06-10 DIAGNOSIS — E66.9 OBESITY (BMI 30-39.9): ICD-10-CM

## 2022-06-10 DIAGNOSIS — E11.69 HYPERLIPIDEMIA DUE TO TYPE 2 DIABETES MELLITUS (HCC): ICD-10-CM

## 2022-06-10 PROCEDURE — 99214 OFFICE O/P EST MOD 30 MIN: CPT | Performed by: FAMILY MEDICINE

## 2022-06-10 RX ORDER — LORAZEPAM 1 MG/1
1 TABLET ORAL
Qty: 8 TABLET | Refills: 2 | Status: SHIPPED | OUTPATIENT
Start: 2022-06-10 | End: 2022-07-10

## 2022-06-10 RX ORDER — ONDANSETRON 4 MG/1
4 TABLET, ORALLY DISINTEGRATING ORAL EVERY 6 HOURS PRN
Qty: 10 TABLET | Refills: 3 | Status: SHIPPED | OUTPATIENT
Start: 2022-06-10 | End: 2022-08-30 | Stop reason: SDUPTHER

## 2022-06-10 ASSESSMENT — FIBROSIS 4 INDEX: FIB4 SCORE: 0.88

## 2022-06-10 NOTE — ASSESSMENT & PLAN NOTE
Reviewed excellent note by previous provider from August 29, 2014.  Discussed headaches with patient.  Diltiazem may be one of the only options I did see in the list that I would be comfortable trying, after discussing with patient instead of continued experimentation at the primary care level we will refer him to the headache clinic with neurology.  I did prescribe him some Zofran and discussed taking that with Tylenol and ibuprofen to try to abort his headache to see if that helps.

## 2022-06-10 NOTE — ASSESSMENT & PLAN NOTE
Discussed risks and benefits of starting statin at this point.  I had a good thorough review the pathophysiology of atherosclerosis.  Patient declines statin at this time would like to check labs again in 6 months and see if his lifestyle changes are having an effect.

## 2022-06-10 NOTE — ASSESSMENT & PLAN NOTE
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  We will try low-dose Ativan to see if that helps him get through the flight.  Patient educated not to mix with alcohol or other drugs of abuse.

## 2022-06-10 NOTE — PROGRESS NOTES
"Subjective:     CC: \"Diabetes follow-up, anxiety, migraine\"    HPI:   Trev presents today with his wife    Diabetes:  Metformin 250 mg BID  Sugars: AM 89  Working out: Treadmill, 1.5 miles walk/jog, walking a mile evening. Stretching daily. He has incorporated some weight lifting.  Cut way down on alcohol  Diet: Low carb, lean meats, added salmon    Anxiety:  Has a lot of anxiety with flying. 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 He has not taken anything for anxiety. He generally doesn't drive after flying.    Migraines:  Significantly long period of having migraines.  These have been worked on extensively by his previous provider and has not had much results for him.  He endorses photophobia and nausea but no somatic symptoms such as weakness or paralysis.  Patient has tried Advil, Tylenol, Excedrin, Imitrex, atenolol, amitriptyline, Topamax without much change.  He had a noncontrast CT of his head in January 2014 that did not show any concerning findings.  He has just learned to live with it but his wife feels like him having headaches every week that sometimes last 2 to 3 days do not be a good way of living.    Problem   Anxiety Attack   Hyperlipidemia Due to Type 2 Diabetes Mellitus (Hcc)         Diabetes Type 2, Uncontrolled (Hcc)   Obesity (Bmi 30-39.9)    5/11/2022: 288 pounds or 131 kg with a BMI of 41.3  6/10/2022: 276 pounds, 125 kg, BMI 39.60     Migraine Headache Without Aura    1/24/2014: Noncontrast CT head  FINDINGS:     There is no evidence of extra-axial hemorrhage. No intra-axial hemorrhage is noted. There is no brain swelling or edema. No mass effect or midline shift is noted.   Impression  NO ACUTE ABNORMALITIES ARE NOTED ON CT SCAN OF THE HEAD.          Current Outpatient Medications Ordered in Epic   Medication Sig Dispense Refill   • LORazepam (ATIVAN) 1 MG Tab Take 1 Tablet by mouth 1 time a day as needed for Anxiety (Flights) for up to 30 days. 8 Tablet 2   • ondansetron (ZOFRAN ODT) 4 " "MG TABLET DISPERSIBLE Take 1 Tablet by mouth every 6 hours as needed for Nausea (Migraines). 10 Tablet 3   • metFORMIN ER (GLUCOPHAGE XR) 500 MG TABLET SR 24 HR Take 2 Tablets by mouth in the morning and 2 Tablets in the evening. 120 Tablet 0     No current Epic-ordered facility-administered medications on file.       Health Maintenance: Not discussed at this visit plan on doing monofilament at next visit    ROS:  ROS see HPI    Objective:     Exam:  /86   Pulse 94   Temp 36.2 °C (97.1 °F) (Temporal)   Ht 1.778 m (5' 10\")   Wt (!) 125 kg (276 lb)   SpO2 94%   BMI 39.60 kg/m²  Body mass index is 39.6 kg/m².    Physical Exam  Vitals reviewed.   Constitutional:       General: He is not in acute distress.     Appearance: Normal appearance. He is obese.   HENT:      Head: Normocephalic and atraumatic.   Cardiovascular:      Rate and Rhythm: Normal rate and regular rhythm.      Heart sounds: Normal heart sounds.   Pulmonary:      Effort: Pulmonary effort is normal.      Breath sounds: Normal breath sounds.   Skin:     General: Skin is warm and dry.   Neurological:      Mental Status: He is alert. Mental status is at baseline.   Psychiatric:         Mood and Affect: Mood normal.         Behavior: Behavior normal.           Assessment & Plan:     36 y.o. male with the following -     Problem List Items Addressed This Visit     Diabetes type 2, uncontrolled (HCC) (Chronic)     Good improvement of a.m. blood sugars.  He is only taking 250 mg of metformin twice daily as a lot of this is diet driven.  He was having little nausea with the metformin so he will keep an eye on this.  Plan to recheck A1c in 3 months.           Hyperlipidemia due to type 2 diabetes mellitus (HCC) (Chronic)     Discussed risks and benefits of starting statin at this point.  I had a good thorough review the pathophysiology of atherosclerosis.  Patient declines statin at this time would like to check labs again in 6 months and see if his " lifestyle changes are having an effect.           Migraine headache without aura     Reviewed excellent note by previous provider from August 29, 2014.  Discussed headaches with patient.  Diltiazem may be one of the only options I did see in the list that I would be comfortable trying, after discussing with patient instead of continued experimentation at the primary care level we will refer him to the headache clinic with neurology.  I did prescribe him some Zofran and discussed taking that with Tylenol and ibuprofen to try to abort his headache to see if that helps.           Relevant Medications    ondansetron (ZOFRAN ODT) 4 MG TABLET DISPERSIBLE    Other Relevant Orders    Referral to Neurology    Obesity (BMI 30-39.9)     He is making great improvements over the last month.  He is improved his diet and started exercising.  Sugars under better control and he is losing weight at a nice rate.           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  We will try low-dose Ativan to see if that helps him get through the flight.  Patient educated not to mix with alcohol or other drugs of abuse.           Relevant Medications    LORazepam (ATIVAN) 1 MG Tab            Return in about 3 months (around 9/10/2022) for Diabetes F/U.    Please note that this dictation was created using voice recognition software. I have made every reasonable attempt to correct obvious errors, but I expect that there are errors of grammar and possibly content that I did not discover before finalizing the note.

## 2022-06-10 NOTE — ASSESSMENT & PLAN NOTE
Good improvement of a.m. blood sugars.  He is only taking 250 mg of metformin twice daily as a lot of this is diet driven.  He was having little nausea with the metformin so he will keep an eye on this.  Plan to recheck A1c in 3 months.

## 2022-06-10 NOTE — ASSESSMENT & PLAN NOTE
He is making great improvements over the last month.  He is improved his diet and started exercising.  Sugars under better control and he is losing weight at a nice rate.

## 2022-08-30 ENCOUNTER — OFFICE VISIT (OUTPATIENT)
Dept: MEDICAL GROUP | Facility: MEDICAL CENTER | Age: 37
End: 2022-08-30
Payer: COMMERCIAL

## 2022-08-30 VITALS
HEIGHT: 70 IN | TEMPERATURE: 97.9 F | HEART RATE: 95 BPM | WEIGHT: 268.4 LBS | BODY MASS INDEX: 38.42 KG/M2 | OXYGEN SATURATION: 99 % | SYSTOLIC BLOOD PRESSURE: 136 MMHG | DIASTOLIC BLOOD PRESSURE: 88 MMHG

## 2022-08-30 DIAGNOSIS — J11.1 INFLUENZA-LIKE ILLNESS: ICD-10-CM

## 2022-08-30 DIAGNOSIS — R42 DIZZINESS: ICD-10-CM

## 2022-08-30 DIAGNOSIS — M54.50 ACUTE BILATERAL LOW BACK PAIN WITHOUT SCIATICA: ICD-10-CM

## 2022-08-30 DIAGNOSIS — E11.65 UNCONTROLLED TYPE 2 DIABETES MELLITUS WITH HYPERGLYCEMIA (HCC): ICD-10-CM

## 2022-08-30 LAB
APPEARANCE UR: CLEAR
BILIRUB UR STRIP-MCNC: NEGATIVE MG/DL
COLOR UR AUTO: YELLOW
EXTERNAL QUALITY CONTROL: NORMAL
GLUCOSE BLD-MCNC: 121 MG/DL (ref 70–100)
GLUCOSE UR STRIP.AUTO-MCNC: NEGATIVE MG/DL
HBA1C MFR BLD: 6.1 % (ref 0–5.6)
INT CON NEG: NEGATIVE
INT CON NEG: NEGATIVE
INT CON POS: POSITIVE
INT CON POS: POSITIVE
KETONES UR STRIP.AUTO-MCNC: NEGATIVE MG/DL
LEUKOCYTE ESTERASE UR QL STRIP.AUTO: NEGATIVE
NITRITE UR QL STRIP.AUTO: NEGATIVE
PH UR STRIP.AUTO: 7 [PH] (ref 5–8)
PROT UR QL STRIP: NEGATIVE MG/DL
RBC UR QL AUTO: NEGATIVE
SARS-COV+SARS-COV-2 AG RESP QL IA.RAPID: NEGATIVE
SP GR UR STRIP.AUTO: 1.01
UROBILINOGEN UR STRIP-MCNC: 0.2 MG/DL

## 2022-08-30 PROCEDURE — 99214 OFFICE O/P EST MOD 30 MIN: CPT | Performed by: FAMILY MEDICINE

## 2022-08-30 PROCEDURE — 87426 SARSCOV CORONAVIRUS AG IA: CPT | Performed by: FAMILY MEDICINE

## 2022-08-30 PROCEDURE — 83036 HEMOGLOBIN GLYCOSYLATED A1C: CPT | Performed by: FAMILY MEDICINE

## 2022-08-30 PROCEDURE — 82962 GLUCOSE BLOOD TEST: CPT | Performed by: FAMILY MEDICINE

## 2022-08-30 PROCEDURE — 81002 URINALYSIS NONAUTO W/O SCOPE: CPT | Performed by: FAMILY MEDICINE

## 2022-08-30 RX ORDER — TIZANIDINE 4 MG/1
4 TABLET ORAL EVERY 6 HOURS PRN
Qty: 30 TABLET | Refills: 3 | Status: SHIPPED | OUTPATIENT
Start: 2022-08-30

## 2022-08-30 RX ORDER — ONDANSETRON 4 MG/1
4 TABLET, ORALLY DISINTEGRATING ORAL EVERY 6 HOURS PRN
Qty: 10 TABLET | Refills: 3 | Status: SHIPPED | OUTPATIENT
Start: 2022-08-30

## 2022-08-30 RX ORDER — PANTOPRAZOLE SODIUM 20 MG/1
20 TABLET, DELAYED RELEASE ORAL DAILY
Qty: 14 TABLET | Refills: 0 | Status: SHIPPED | OUTPATIENT
Start: 2022-08-30

## 2022-08-30 ASSESSMENT — FIBROSIS 4 INDEX: FIB4 SCORE: 0.88

## 2022-08-30 NOTE — PROGRESS NOTES
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  Started feeling lightheaded  Now getting back pain mid to lower back, generally worse on left than right, no radiation down legs  Yesterday felt as if he could faint  Endorses nausea  Today getting abdominal pain with eating  No vomiting  Getting some dry mouth  Sometimes feels like heart burn  Has history of vertigo, working with ENT, has not scheduled head scan yet  Has had some increased congestion and start of sore throat      Problem   Dizziness   Diabetes Type 2, Uncontrolled (Hcc)   Low Back Pain       Current Outpatient Medications Ordered in Epic   Medication Sig Dispense Refill    pantoprazole (PROTONIX) 20 MG tablet Take 1 Tablet by mouth every day. 14 Tablet 0    ondansetron (ZOFRAN ODT) 4 MG TABLET DISPERSIBLE Take 1 Tablet by mouth every 6 hours as needed for Nausea (Migraines). 10 Tablet 3    tizanidine (ZANAFLEX) 4 MG Tab Take 1 Tablet by mouth every 6 hours as needed (low back pain). 30 Tablet 3    metFORMIN ER (GLUCOPHAGE XR) 500 MG TABLET SR 24 HR Take 2 Tablets by mouth in the morning and 2 Tablets in the evening. 120 Tablet 0     No current Epic-ordered facility-administered medications on file.       Health Maintenance: A1c    ROS:  ROS see HPI    Objective:     Exam:  /88   Pulse 95   Temp 36.6 °C (97.9 °F) (Temporal)   Ht 1.778 m (5' 10\")   Wt 122 kg (268 lb 6.4 oz)   SpO2 99%   BMI 38.51 kg/m²  Body mass index is 38.51 kg/m².    Physical Exam  Vitals reviewed.   Constitutional:       General: He is not in acute distress.     Appearance: Normal appearance. He is obese. He is not toxic-appearing.   HENT:      Head: Normocephalic and atraumatic.      Right Ear: Tympanic membrane normal.      Ears:      Comments: Small hole in left TM without signs of bulging or drainage  Eyes:      Extraocular " Movements: Extraocular movements intact.      Pupils: Pupils are equal, round, and reactive to light.   Cardiovascular:      Rate and Rhythm: Normal rate and regular rhythm.      Heart sounds: Normal heart sounds. No murmur heard.  Pulmonary:      Effort: Pulmonary effort is normal. No respiratory distress.      Breath sounds: Normal breath sounds.   Abdominal:      General: There is no distension.      Palpations: Abdomen is soft.      Tenderness: There is abdominal tenderness (epigastric).   Skin:     General: Skin is warm and dry.   Neurological:      Mental Status: He is alert. Mental status is at baseline.      Motor: No weakness.      Gait: Gait normal.   Psychiatric:         Mood and Affect: Mood normal.         Behavior: Behavior normal.         Labs:             Assessment & Plan:     36 y.o. male with the following -     Problem List Items Addressed This Visit       Diabetes type 2, uncontrolled (HCC) (Chronic)     A1c shows good improvement  Continue current management  We will recheck this monitor  Do not think hypoglycemia or hyperglycemia play         Relevant Orders    POCT  A1C (Completed)    POCT Urinalysis (Completed)    POCT Glucose (Completed)    Comp Metabolic Panel    Low back pain     UA did not show signs of infection we will check kidney and liver function  Negative for musculoskeletal provided information regarding heating pad, home exercises, referral to physical therapy, muscle relaxers         Relevant Medications    tizanidine (ZANAFLEX) 4 MG Tab    Other Relevant Orders    Referral to Physical Therapy    Dizziness     Patient to follow-up with ENT  Possible congestion or even pressure drainage of left ear the TM has a tiny hole in it that may be from flying he could be contributing to his symptoms         Relevant Orders    POCT Urinalysis (Completed)    Comp Metabolic Panel     Other Visit Diagnoses       Influenza-like illness        Relevant Orders    POCT SARS-COV Antigen MOHIT  (Symptomatic only) (Completed)            I spent a total of 32 minutes with record review, exam, communication with the patient, communication with other providers, and documentation of this encounter.      Return if symptoms worsen or fail to improve.    Please note that this dictation was created using voice recognition software. I have made every reasonable attempt to correct obvious errors, but I expect that there are errors of grammar and possibly content that I did not discover before finalizing the note.

## 2022-08-30 NOTE — LETTER
[facility] [address] [facility]  [place] [address]     August 30, 2022    Patient: Trev Castillo   YOB: 1985   Date of Visit: 8/30/2022       To Whom It May Concern:    Trev Castillo was seen and treated in our department on 8/30/2022. Please excuse from work until Tuesday 9/6/22 due to acute illness.        Sincerely,         Flaco Cruz D.O.

## 2022-08-30 NOTE — ASSESSMENT & PLAN NOTE
A1c shows good improvement  Continue current management  We will recheck this monitor  Do not think hypoglycemia or hyperglycemia play

## 2022-08-30 NOTE — ASSESSMENT & PLAN NOTE
UA did not show signs of infection we will check kidney and liver function  Negative for musculoskeletal provided information regarding heating pad, home exercises, referral to physical therapy, muscle relaxers

## 2022-08-30 NOTE — ASSESSMENT & PLAN NOTE
Patient to follow-up with ENT  Possible congestion or even pressure drainage of left ear the TM has a tiny hole in it that may be from flying he could be contributing to his symptoms

## 2022-09-02 ENCOUNTER — HOSPITAL ENCOUNTER (OUTPATIENT)
Dept: LAB | Facility: MEDICAL CENTER | Age: 37
End: 2022-09-02
Attending: FAMILY MEDICINE
Payer: COMMERCIAL

## 2022-09-02 DIAGNOSIS — E11.65 UNCONTROLLED TYPE 2 DIABETES MELLITUS WITH HYPERGLYCEMIA (HCC): ICD-10-CM

## 2022-09-02 DIAGNOSIS — R42 DIZZINESS: ICD-10-CM

## 2022-09-02 LAB
ALBUMIN SERPL BCP-MCNC: 4.6 G/DL (ref 3.2–4.9)
ALBUMIN/GLOB SERPL: 1.6 G/DL
ALP SERPL-CCNC: 92 U/L (ref 30–99)
ALT SERPL-CCNC: 35 U/L (ref 2–50)
ANION GAP SERPL CALC-SCNC: 9 MMOL/L (ref 7–16)
AST SERPL-CCNC: 24 U/L (ref 12–45)
BILIRUB SERPL-MCNC: 0.2 MG/DL (ref 0.1–1.5)
BUN SERPL-MCNC: 10 MG/DL (ref 8–22)
CALCIUM SERPL-MCNC: 9.5 MG/DL (ref 8.5–10.5)
CHLORIDE SERPL-SCNC: 103 MMOL/L (ref 96–112)
CO2 SERPL-SCNC: 27 MMOL/L (ref 20–33)
CREAT SERPL-MCNC: 0.83 MG/DL (ref 0.5–1.4)
GFR SERPLBLD CREATININE-BSD FMLA CKD-EPI: 114 ML/MIN/1.73 M 2
GLOBULIN SER CALC-MCNC: 2.9 G/DL (ref 1.9–3.5)
GLUCOSE SERPL-MCNC: 131 MG/DL (ref 65–99)
POTASSIUM SERPL-SCNC: 4.5 MMOL/L (ref 3.6–5.5)
PROT SERPL-MCNC: 7.5 G/DL (ref 6–8.2)
SODIUM SERPL-SCNC: 139 MMOL/L (ref 135–145)

## 2022-09-02 PROCEDURE — 36415 COLL VENOUS BLD VENIPUNCTURE: CPT

## 2022-09-02 PROCEDURE — 80053 COMPREHEN METABOLIC PANEL: CPT

## 2022-10-28 ENCOUNTER — TELEPHONE (OUTPATIENT)
Dept: HEALTH INFORMATION MANAGEMENT | Facility: OTHER | Age: 37
End: 2022-10-28
Payer: COMMERCIAL

## 2022-11-14 DIAGNOSIS — E11.41 DIABETIC MONONEUROPATHY ASSOCIATED WITH TYPE 2 DIABETES MELLITUS (HCC): Chronic | ICD-10-CM

## 2022-11-14 DIAGNOSIS — F41.0 ANXIETY ATTACK: ICD-10-CM

## 2022-11-14 DIAGNOSIS — E78.5 HYPERLIPIDEMIA DUE TO TYPE 2 DIABETES MELLITUS (HCC): Chronic | ICD-10-CM

## 2022-11-14 DIAGNOSIS — E11.69 HYPERLIPIDEMIA DUE TO TYPE 2 DIABETES MELLITUS (HCC): Chronic | ICD-10-CM

## 2022-12-05 ENCOUNTER — OFFICE VISIT (OUTPATIENT)
Dept: URGENT CARE | Facility: PHYSICIAN GROUP | Age: 37
End: 2022-12-05
Payer: COMMERCIAL

## 2022-12-05 VITALS
SYSTOLIC BLOOD PRESSURE: 118 MMHG | BODY MASS INDEX: 40.74 KG/M2 | DIASTOLIC BLOOD PRESSURE: 88 MMHG | OXYGEN SATURATION: 94 % | HEART RATE: 86 BPM | RESPIRATION RATE: 16 BRPM | WEIGHT: 284.6 LBS | TEMPERATURE: 97.7 F | HEIGHT: 70 IN

## 2022-12-05 DIAGNOSIS — H66.006 RECURRENT ACUTE SUPPURATIVE OTITIS MEDIA WITHOUT SPONTANEOUS RUPTURE OF TYMPANIC MEMBRANE OF BOTH SIDES: ICD-10-CM

## 2022-12-05 PROCEDURE — 99213 OFFICE O/P EST LOW 20 MIN: CPT | Performed by: FAMILY MEDICINE

## 2022-12-05 RX ORDER — CEFDINIR 300 MG/1
300 CAPSULE ORAL 2 TIMES DAILY
Qty: 14 CAPSULE | Refills: 0 | Status: SHIPPED | OUTPATIENT
Start: 2022-12-05 | End: 2022-12-12

## 2022-12-05 ASSESSMENT — FIBROSIS 4 INDEX: FIB4 SCORE: 0.86

## 2022-12-06 NOTE — PROGRESS NOTES
"Subjective     Trev Castillo is a 37 y.o. male who presents with Otalgia (Drainage and pain, both sides, x2-3 days, ringing in ears )            3 days bilateral earache.  Drainage.  Preceded by 1 week nasal congestion/flu like symptoms.  PMH otitis media.  No recent swimming.  No trauma or barotrauma.  Hearing is muffled.  No relief with OTC medications.  No other aggravating alleviating factors.      Review of Systems   Constitutional:  Negative for malaise/fatigue and weight loss.   Eyes:  Negative for discharge and redness.   Gastrointestinal:  Negative for nausea and vomiting.   Musculoskeletal:  Negative for joint pain and myalgias.   Skin:  Negative for itching and rash.            Objective     /88   Pulse 86   Temp 36.5 °C (97.7 °F) (Temporal)   Resp 16   Ht 1.778 m (5' 10\")   Wt (!) 129 kg (284 lb 9.6 oz)   SpO2 94%   BMI 40.84 kg/m²      Physical Exam  Constitutional:       General: He is not in acute distress.     Appearance: He is well-developed.   HENT:      Head: Normocephalic and atraumatic.      Ears:      Comments: TMs are red bulging and dull bilaterally  Eyes:      Conjunctiva/sclera: Conjunctivae normal.   Cardiovascular:      Rate and Rhythm: Normal rate and regular rhythm.      Heart sounds: Normal heart sounds. No murmur heard.  Pulmonary:      Effort: Pulmonary effort is normal.      Breath sounds: Normal breath sounds. No wheezing.   Skin:     General: Skin is warm and dry.      Findings: No rash.   Neurological:      Mental Status: He is alert.                           Assessment & Plan        1. Recurrent acute suppurative otitis media without spontaneous rupture of tympanic membrane of both sides  cefdinir (OMNICEF) 300 MG Cap        Differential diagnosis, natural history, supportive care, and indications for immediate follow-up discussed at length.              "

## 2022-12-10 ASSESSMENT — ENCOUNTER SYMPTOMS
VOMITING: 0
EYE DISCHARGE: 0
EYE REDNESS: 0
NAUSEA: 0
MYALGIAS: 0
WEIGHT LOSS: 0